# Patient Record
Sex: FEMALE | Race: BLACK OR AFRICAN AMERICAN | NOT HISPANIC OR LATINO | URBAN - METROPOLITAN AREA
[De-identification: names, ages, dates, MRNs, and addresses within clinical notes are randomized per-mention and may not be internally consistent; named-entity substitution may affect disease eponyms.]

---

## 2017-11-14 ENCOUNTER — EMERGENCY (EMERGENCY)
Facility: HOSPITAL | Age: 22
LOS: 1 days | Discharge: STILL ADMITTED OR ADMINISTRATIVE | End: 2017-11-14
Admitting: EMERGENCY MEDICINE

## 2017-11-14 ENCOUNTER — OUTPATIENT (OUTPATIENT)
Dept: OUTPATIENT SERVICES | Facility: HOSPITAL | Age: 22
LOS: 1 days | End: 2017-11-14

## 2017-11-14 VITALS
RESPIRATION RATE: 18 BRPM | HEART RATE: 85 BPM | SYSTOLIC BLOOD PRESSURE: 118 MMHG | OXYGEN SATURATION: 98 % | DIASTOLIC BLOOD PRESSURE: 64 MMHG | TEMPERATURE: 98 F

## 2017-11-14 DIAGNOSIS — Z3A.00 WEEKS OF GESTATION OF PREGNANCY NOT SPECIFIED: ICD-10-CM

## 2017-11-14 DIAGNOSIS — O26.899 OTHER SPECIFIED PREGNANCY RELATED CONDITIONS, UNSPECIFIED TRIMESTER: ICD-10-CM

## 2017-11-14 LAB
APPEARANCE UR: SIGNIFICANT CHANGE UP
BACTERIA # UR AUTO: SIGNIFICANT CHANGE UP
BILIRUB UR-MCNC: NEGATIVE — SIGNIFICANT CHANGE UP
BLOOD UR QL VISUAL: NEGATIVE — SIGNIFICANT CHANGE UP
COLOR SPEC: SIGNIFICANT CHANGE UP
GLUCOSE UR-MCNC: NEGATIVE — SIGNIFICANT CHANGE UP
KETONES UR-MCNC: NEGATIVE — SIGNIFICANT CHANGE UP
LEUKOCYTE ESTERASE UR-ACNC: NEGATIVE — SIGNIFICANT CHANGE UP
MUCOUS THREADS # UR AUTO: SIGNIFICANT CHANGE UP
NITRITE UR-MCNC: NEGATIVE — SIGNIFICANT CHANGE UP
PH UR: 7 — SIGNIFICANT CHANGE UP (ref 4.6–8)
PROT UR-MCNC: NEGATIVE — SIGNIFICANT CHANGE UP
RBC CASTS # UR COMP ASSIST: SIGNIFICANT CHANGE UP (ref 0–?)
SP GR SPEC: 1.01 — SIGNIFICANT CHANGE UP (ref 1–1.03)
SQUAMOUS # UR AUTO: SIGNIFICANT CHANGE UP
UROBILINOGEN FLD QL: NORMAL E.U. — SIGNIFICANT CHANGE UP (ref 0.1–0.2)
WBC UR QL: SIGNIFICANT CHANGE UP (ref 0–?)

## 2017-11-14 NOTE — ED ADULT NURSE NOTE - CHIEF COMPLAINT QUOTE
p/t is 20 weeks pregnant c/o of abd discomfort, denies any vag issues,  p/t seen in another ED for same issue and was  told "baby needs to be aborted"

## 2020-03-03 ENCOUNTER — TRANSCRIPTION ENCOUNTER (OUTPATIENT)
Age: 25
End: 2020-03-03

## 2020-07-02 ENCOUNTER — EMERGENCY (EMERGENCY)
Facility: HOSPITAL | Age: 25
LOS: 1 days | Discharge: HOME | End: 2020-07-02
Attending: EMERGENCY MEDICINE | Admitting: EMERGENCY MEDICINE
Payer: MEDICAID

## 2020-07-02 VITALS
TEMPERATURE: 99 F | OXYGEN SATURATION: 100 % | SYSTOLIC BLOOD PRESSURE: 104 MMHG | HEART RATE: 86 BPM | RESPIRATION RATE: 17 BRPM | DIASTOLIC BLOOD PRESSURE: 66 MMHG

## 2020-07-02 PROCEDURE — 99283 EMERGENCY DEPT VISIT LOW MDM: CPT

## 2020-07-02 NOTE — ED PROVIDER NOTE - NSFOLLOWUPINSTRUCTIONS_ED_ALL_ED_FT
Continue to take motrin or tylenol as needed for pain.  Avoid any heavy lifting, you may wear an abdominal band for support.  Follow up with a general surgeon- see attached referral list.  Return to ED for any worsening pain, vomiting or fever.

## 2020-07-02 NOTE — ED PROVIDER NOTE - CLINICAL SUMMARY MEDICAL DECISION MAKING FREE TEXT BOX
24 y/o F no PMHx s/p  2 years ago presents to ED c/o hernia pain. Pt noted to have reproducible hernia on exam with mild tenderness. No imagining indicated at this time as pt is very well appearing. Give surgical referral and continue NSAIDs. 26 y/o F no PMHx s/p  2 years ago presents to ED c/o hernia pain. Pt noted to have reproducible hernia on exam with mild tenderness. No imaging indicated at this time as pt is very well appearing.  Will give surgery referral and continue NSAIDs prn.

## 2020-07-02 NOTE — ED PROVIDER NOTE - OBJECTIVE STATEMENT
24 y/o F no PMHx s/p  2 years ago presents to ED c/o hernia pain. Pt states after she gave birth she developed abdominal hernia. Pt states hasn't given much problems however 4 days ago lifted her son and felt "tear" in area causing severe pain. Pt states have been taking Ibuprofen for pain which does help. Denies nausea, vomiting, fever. Notes has been spotting over past week after intercourse and has IDU in place. Former smoker. NKDA 24 y/o F no PMHx s/p  2 years ago presents to ED c/o hernia pain. Pt states after she gave birth she developed an abdominal hernia. Pt states was told doesn't need surgery however 4 days ago when she lifted her son she felt a "tear" in that area causing severe pain. Pt states she has been taking Ibuprofen for pain which does help. Denies nausea, vomiting, fever. Notes has been spotting over past week after intercourse and has IUD in place. Former smoker. NKDA 26 y/o F no PMHx s/p  2 years ago presents to ED c/o hernia pain. Pt states after she gave birth she developed an abdominal hernia. Pt states was told doesn't need surgery however 4 days ago when she lifted her son she felt a "tear" in that area causing severe pain. Pt states she has been taking Ibuprofen for pain which does help. Denies nausea, vomiting, fever. Notes has been spotting over past week after intercourse and has IUD in place. Former smoker. NKDA    Attendinyo female presents with increased pain and swelling to the umbilical area.  no nausea or vomiting.  able to reduce the area of swelling without difficulty.  no redness

## 2020-07-02 NOTE — ED PROVIDER NOTE - PATIENT PORTAL LINK FT
You can access the FollowMyHealth Patient Portal offered by [facility] by registering at the following website: [email or web address]. By joining [facility]’s FollowMyHealth portal, you will also be able to view your health information using other applications (apps) compatible with our system.

## 2020-07-20 ENCOUNTER — APPOINTMENT (OUTPATIENT)
Dept: SURGERY | Facility: CLINIC | Age: 25
End: 2020-07-20
Payer: MEDICAID

## 2020-07-20 VITALS
HEIGHT: 64.5 IN | DIASTOLIC BLOOD PRESSURE: 67 MMHG | BODY MASS INDEX: 24.18 KG/M2 | SYSTOLIC BLOOD PRESSURE: 106 MMHG | WEIGHT: 143.38 LBS | OXYGEN SATURATION: 100 % | HEART RATE: 76 BPM | TEMPERATURE: 98.2 F

## 2020-07-20 PROCEDURE — 99204 OFFICE O/P NEW MOD 45 MIN: CPT

## 2020-07-20 NOTE — PLAN
[FreeTextEntry1] : \par - I spoke with the patient regarding the findings and offered her robotic-assisted laparoscopic ventral hernia repair with mesh, possible open\par - We discussed the indications, risks, benefits, and alternatives of the procedure including the use of mesh\par - The patient stated she understood, gave consent, and all her questions were answered\par - Will plan for OR dates\par - Patient advised that should the pain worsen prior to procedure being performed, or if she develops symptoms consistent with obstruction, to go to the ED for immediate evaluation, to which she was agreeable to

## 2020-07-20 NOTE — HISTORY OF PRESENT ILLNESS
[de-identified] : Patient is a 25 year old woman who presents with supraumbilical hernia, which she states she first noted after the birth of her child, and has been worsening in the past few months. She notes that the hernia was initially reducible, but has no longer been so in the past few months, and is causing pain, particularly with movement. She denies N/V, no fever/chills, tolerating diet without issues, normal bowel function. She denies any medical problems, no past surgeries, occasionally smokes tobacco and drinks EtOH socially. NKDA.

## 2020-07-20 NOTE — PHYSICAL EXAM
[Respiratory Effort] : normal respiratory effort [Alert] : alert [Normal Rate and Rhythm] : normal rate and rhythm [Oriented to Person] : oriented to person [Oriented to Place] : oriented to place [Calm] : calm [Oriented to Time] : oriented to time [de-identified] : NCAT [de-identified] : NAD [de-identified] : Healed piercing superior to umbilicus without signs of infection [de-identified] : Soft, non-distended, non-reducible hernia superior to the umbilicus measuring approximately 2 cm in diameter which is TTP, non-TTP in all other quadrants, no rebound/guarding

## 2020-08-06 ENCOUNTER — OUTPATIENT (OUTPATIENT)
Dept: OUTPATIENT SERVICES | Facility: HOSPITAL | Age: 25
LOS: 1 days | Discharge: HOME | End: 2020-08-06

## 2020-08-06 VITALS
RESPIRATION RATE: 16 BRPM | WEIGHT: 147.27 LBS | HEART RATE: 71 BPM | TEMPERATURE: 99 F | OXYGEN SATURATION: 100 % | HEIGHT: 64 IN | DIASTOLIC BLOOD PRESSURE: 57 MMHG | SYSTOLIC BLOOD PRESSURE: 98 MMHG

## 2020-08-06 DIAGNOSIS — K43.6 OTHER AND UNSPECIFIED VENTRAL HERNIA WITH OBSTRUCTION, WITHOUT GANGRENE: ICD-10-CM

## 2020-08-06 DIAGNOSIS — Z01.818 ENCOUNTER FOR OTHER PREPROCEDURAL EXAMINATION: ICD-10-CM

## 2020-08-06 DIAGNOSIS — Z29.9 ENCOUNTER FOR PROPHYLACTIC MEASURES, UNSPECIFIED: ICD-10-CM

## 2020-08-06 LAB
ANION GAP SERPL CALC-SCNC: 5 MMOL/L — SIGNIFICANT CHANGE UP (ref 5–17)
APTT BLD: 36.4 SEC — HIGH (ref 27.5–35.5)
BLD GP AB SCN SERPL QL: SIGNIFICANT CHANGE UP
BUN SERPL-MCNC: 7 MG/DL — SIGNIFICANT CHANGE UP (ref 7–23)
CALCIUM SERPL-MCNC: 9.1 MG/DL — SIGNIFICANT CHANGE UP (ref 8.5–10.1)
CHLORIDE SERPL-SCNC: 105 MMOL/L — SIGNIFICANT CHANGE UP (ref 96–108)
CO2 SERPL-SCNC: 30 MMOL/L — SIGNIFICANT CHANGE UP (ref 22–31)
CREAT SERPL-MCNC: 0.6 MG/DL — SIGNIFICANT CHANGE UP (ref 0.5–1.3)
GLUCOSE SERPL-MCNC: 68 MG/DL — LOW (ref 70–99)
HCG UR QL: NEGATIVE — SIGNIFICANT CHANGE UP
HCT VFR BLD CALC: 37.7 % — SIGNIFICANT CHANGE UP (ref 34.5–45)
HGB BLD-MCNC: 11.7 G/DL — SIGNIFICANT CHANGE UP (ref 11.5–15.5)
INR BLD: 1.12 RATIO — SIGNIFICANT CHANGE UP (ref 0.88–1.16)
MCHC RBC-ENTMCNC: 26.8 PG — LOW (ref 27–34)
MCHC RBC-ENTMCNC: 31 GM/DL — LOW (ref 32–36)
MCV RBC AUTO: 86.3 FL — SIGNIFICANT CHANGE UP (ref 80–100)
NRBC # BLD: 0 /100 WBCS — SIGNIFICANT CHANGE UP (ref 0–0)
PLATELET # BLD AUTO: 284 K/UL — SIGNIFICANT CHANGE UP (ref 150–400)
POTASSIUM SERPL-MCNC: 4.1 MMOL/L — SIGNIFICANT CHANGE UP (ref 3.5–5.3)
POTASSIUM SERPL-SCNC: 4.1 MMOL/L — SIGNIFICANT CHANGE UP (ref 3.5–5.3)
PROTHROM AB SERPL-ACNC: 12.9 SEC — SIGNIFICANT CHANGE UP (ref 10.6–13.6)
RBC # BLD: 4.37 M/UL — SIGNIFICANT CHANGE UP (ref 3.8–5.2)
RBC # FLD: 14.4 % — SIGNIFICANT CHANGE UP (ref 10.3–14.5)
SODIUM SERPL-SCNC: 140 MMOL/L — SIGNIFICANT CHANGE UP (ref 135–145)
WBC # BLD: 4.68 K/UL — SIGNIFICANT CHANGE UP (ref 3.8–10.5)
WBC # FLD AUTO: 4.68 K/UL — SIGNIFICANT CHANGE UP (ref 3.8–10.5)

## 2020-08-06 NOTE — H&P PST ADULT - ATTENDING COMMENTS
I spoke with the patient regarding the planned robotic-assisted laparoscopic ventral hernia repair with mesh, possible open. We discussed the risks, benefits, and alternatives of the procedure. The patient stated she understood, gave consent, and all her questions were answered.

## 2020-08-06 NOTE — H&P PST ADULT - ASSESSMENT
SUMITI VTE 2.0 SCORE [CLOT updated 2019]    AGE RELATED RISK FACTORS                                                       MOBILITY RELATED FACTORS  [ ] Age 41-60 years                                            (1 Point)                    [ ] Bed rest                                                        (1 Point)  [ ] Age: 61-74 years                                           (2 Points)                  [ ] Plaster cast                                                   (2 Points)  [ ] Age= 75 years                                              (3 Points)                    [ ] Bed bound for more than 72 hours                 (2 Points)    DISEASE RELATED RISK FACTORS                                               GENDER SPECIFIC FACTORS  [ ] Edema in the lower extremities                       (1 Point)              [ ] Pregnancy                                                     (1 Point)  [ ] Varicose veins                                               (1 Point)                     [ ] Post-partum < 6 weeks                                   (1 Point)             [ x] BMI > 25 Kg/m2                                            (1 Point)                     [ ] Hormonal therapy  or oral contraception          (1 Point)                 [ ] Sepsis (in the previous month)                        (1 Point)               [ ] History of pregnancy complications                 (1 point)  [ ] Pneumonia or serious lung disease                                               [ ] Unexplained or recurrent                     (1 Point)           (in the previous month)                               (1 Point)  [ ] Abnormal pulmonary function test                     (1 Point)                 SURGERY RELATED RISK FACTORS  [ ] Acute myocardial infarction                              (1 Point)               [ ]  Section                                             (1 Point)  [ ] Congestive heart failure (in the previous month)  (1 Point)      [ ] Minor surgery                                                  (1 Point)   [ ] Inflammatory bowel disease                             (1 Point)               [ ] Arthroscopic surgery                                        (2 Points)  [ ] Central venous access                                      (2 Points)                [x ] General surgery lasting more than 45 minutes (2 points)  [ ] Malignancy- Present or previous                   (2 Points)                [ ] Elective arthroplasty                                         (5 points)    [ ] Stroke (in the previous month)                          (5 Points)                                                                                                                                                           HEMATOLOGY RELATED FACTORS                                                 TRAUMA RELATED RISK FACTORS  [ ] Prior episodes of VTE                                     (3 Points)                [ ] Fracture of the hip, pelvis, or leg                       (5 Points)  [ ] Positive family history for VTE                         (3 Points)             [ ] Acute spinal cord injury (in the previous month)  (5 Points)  [ ] Prothrombin 29211 A                                     (3 Points)               [ ] Paralysis  (less than 1 month)                             (5 Points)  [ ] Factor V Leiden                                             (3 Points)                  [ ] Multiple Trauma within 1 month                        (5 Points)  [ ] Lupus anticoagulants                                     (3 Points)                                                           [ ] Anticardiolipin antibodies                               (3 Points)                                                       [ ] High homocysteine in the blood                      (3 Points)                                             [ ] Other congenital or acquired thrombophilia      (3 Points)                                                [ ] Heparin induced thrombocytopenia                  (3 Points)                                     Total Score [      3    ]

## 2020-08-09 ENCOUNTER — OUTPATIENT (OUTPATIENT)
Dept: OUTPATIENT SERVICES | Facility: HOSPITAL | Age: 25
LOS: 1 days | Discharge: HOME | End: 2020-08-09

## 2020-08-09 DIAGNOSIS — Z01.818 ENCOUNTER FOR OTHER PREPROCEDURAL EXAMINATION: ICD-10-CM

## 2020-08-09 LAB — SARS-COV-2 RNA SPEC QL NAA+PROBE: SIGNIFICANT CHANGE UP

## 2020-08-11 ENCOUNTER — TRANSCRIPTION ENCOUNTER (OUTPATIENT)
Age: 25
End: 2020-08-11

## 2020-08-12 ENCOUNTER — TRANSCRIPTION ENCOUNTER (OUTPATIENT)
Age: 25
End: 2020-08-12

## 2020-08-12 ENCOUNTER — INPATIENT (INPATIENT)
Facility: HOSPITAL | Age: 25
LOS: 1 days | Discharge: HOME | End: 2020-08-14
Attending: STUDENT IN AN ORGANIZED HEALTH CARE EDUCATION/TRAINING PROGRAM | Admitting: STUDENT IN AN ORGANIZED HEALTH CARE EDUCATION/TRAINING PROGRAM
Payer: MEDICAID

## 2020-08-12 VITALS
WEIGHT: 145.06 LBS | OXYGEN SATURATION: 100 % | HEART RATE: 70 BPM | DIASTOLIC BLOOD PRESSURE: 72 MMHG | RESPIRATION RATE: 18 BRPM | SYSTOLIC BLOOD PRESSURE: 112 MMHG | HEIGHT: 64 IN | TEMPERATURE: 99 F

## 2020-08-12 LAB — HCG UR QL: NEGATIVE — SIGNIFICANT CHANGE UP

## 2020-08-12 PROCEDURE — 49653: CPT

## 2020-08-12 PROCEDURE — 49585: CPT | Mod: AS

## 2020-08-12 PROCEDURE — S2900 ROBOTIC SURGICAL SYSTEM: CPT | Mod: NC

## 2020-08-12 PROCEDURE — 49561: CPT | Mod: AS

## 2020-08-12 RX ORDER — SODIUM CHLORIDE 9 MG/ML
1000 INJECTION, SOLUTION INTRAVENOUS
Refills: 0 | Status: DISCONTINUED | OUTPATIENT
Start: 2020-08-12 | End: 2020-08-13

## 2020-08-12 RX ORDER — CYCLOBENZAPRINE HYDROCHLORIDE 10 MG/1
1 TABLET, FILM COATED ORAL
Qty: 20 | Refills: 0
Start: 2020-08-12

## 2020-08-12 RX ORDER — SODIUM CHLORIDE 9 MG/ML
3 INJECTION INTRAMUSCULAR; INTRAVENOUS; SUBCUTANEOUS EVERY 8 HOURS
Refills: 0 | Status: DISCONTINUED | OUTPATIENT
Start: 2020-08-12 | End: 2020-08-12

## 2020-08-12 RX ORDER — ONDANSETRON 8 MG/1
4 TABLET, FILM COATED ORAL ONCE
Refills: 0 | Status: DISCONTINUED | OUTPATIENT
Start: 2020-08-12 | End: 2020-08-12

## 2020-08-12 RX ORDER — MORPHINE SULFATE 50 MG/1
2 CAPSULE, EXTENDED RELEASE ORAL EVERY 4 HOURS
Refills: 0 | Status: DISCONTINUED | OUTPATIENT
Start: 2020-08-12 | End: 2020-08-13

## 2020-08-12 RX ORDER — HYDROMORPHONE HYDROCHLORIDE 2 MG/ML
0.5 INJECTION INTRAMUSCULAR; INTRAVENOUS; SUBCUTANEOUS
Refills: 0 | Status: DISCONTINUED | OUTPATIENT
Start: 2020-08-12 | End: 2020-08-12

## 2020-08-12 RX ORDER — HEPARIN SODIUM 5000 [USP'U]/ML
5000 INJECTION INTRAVENOUS; SUBCUTANEOUS EVERY 12 HOURS
Refills: 0 | Status: DISCONTINUED | OUTPATIENT
Start: 2020-08-12 | End: 2020-08-14

## 2020-08-12 RX ORDER — SODIUM CHLORIDE 9 MG/ML
1000 INJECTION, SOLUTION INTRAVENOUS
Refills: 0 | Status: DISCONTINUED | OUTPATIENT
Start: 2020-08-12 | End: 2020-08-12

## 2020-08-12 RX ORDER — HYDROMORPHONE HYDROCHLORIDE 2 MG/ML
1 INJECTION INTRAMUSCULAR; INTRAVENOUS; SUBCUTANEOUS ONCE
Refills: 0 | Status: DISCONTINUED | OUTPATIENT
Start: 2020-08-12 | End: 2020-08-12

## 2020-08-12 RX ORDER — FENTANYL CITRATE 50 UG/ML
50 INJECTION INTRAVENOUS
Refills: 0 | Status: DISCONTINUED | OUTPATIENT
Start: 2020-08-12 | End: 2020-08-12

## 2020-08-12 RX ORDER — KETOROLAC TROMETHAMINE 30 MG/ML
15 SYRINGE (ML) INJECTION EVERY 6 HOURS
Refills: 0 | Status: DISCONTINUED | OUTPATIENT
Start: 2020-08-12 | End: 2020-08-13

## 2020-08-12 RX ORDER — OXYCODONE AND ACETAMINOPHEN 5; 325 MG/1; MG/1
1 TABLET ORAL EVERY 4 HOURS
Refills: 0 | Status: DISCONTINUED | OUTPATIENT
Start: 2020-08-12 | End: 2020-08-12

## 2020-08-12 RX ORDER — OXYCODONE HYDROCHLORIDE 5 MG/1
10 TABLET ORAL EVERY 4 HOURS
Refills: 0 | Status: DISCONTINUED | OUTPATIENT
Start: 2020-08-12 | End: 2020-08-14

## 2020-08-12 RX ORDER — OXYCODONE HYDROCHLORIDE 5 MG/1
5 TABLET ORAL EVERY 4 HOURS
Refills: 0 | Status: DISCONTINUED | OUTPATIENT
Start: 2020-08-12 | End: 2020-08-14

## 2020-08-12 RX ORDER — FENTANYL CITRATE 50 UG/ML
25 INJECTION INTRAVENOUS
Refills: 0 | Status: DISCONTINUED | OUTPATIENT
Start: 2020-08-12 | End: 2020-08-12

## 2020-08-12 RX ORDER — ACETAMINOPHEN 500 MG
1000 TABLET ORAL ONCE
Refills: 0 | Status: COMPLETED | OUTPATIENT
Start: 2020-08-12 | End: 2020-08-13

## 2020-08-12 RX ORDER — CYCLOBENZAPRINE HYDROCHLORIDE 10 MG/1
5 TABLET, FILM COATED ORAL THREE TIMES A DAY
Refills: 0 | Status: DISCONTINUED | OUTPATIENT
Start: 2020-08-12 | End: 2020-08-14

## 2020-08-12 RX ADMIN — FENTANYL CITRATE 25 MICROGRAM(S): 50 INJECTION INTRAVENOUS at 13:52

## 2020-08-12 RX ADMIN — Medication 15 MILLIGRAM(S): at 18:30

## 2020-08-12 RX ADMIN — HYDROMORPHONE HYDROCHLORIDE 1 MILLIGRAM(S): 2 INJECTION INTRAMUSCULAR; INTRAVENOUS; SUBCUTANEOUS at 14:44

## 2020-08-12 RX ADMIN — MORPHINE SULFATE 2 MILLIGRAM(S): 50 CAPSULE, EXTENDED RELEASE ORAL at 21:01

## 2020-08-12 RX ADMIN — Medication 15 MILLIGRAM(S): at 23:18

## 2020-08-12 RX ADMIN — Medication 15 MILLIGRAM(S): at 23:48

## 2020-08-12 RX ADMIN — Medication 15 MILLIGRAM(S): at 17:39

## 2020-08-12 RX ADMIN — FENTANYL CITRATE 50 MICROGRAM(S): 50 INJECTION INTRAVENOUS at 14:10

## 2020-08-12 RX ADMIN — HYDROMORPHONE HYDROCHLORIDE 0.5 MILLIGRAM(S): 2 INJECTION INTRAMUSCULAR; INTRAVENOUS; SUBCUTANEOUS at 16:46

## 2020-08-12 RX ADMIN — MORPHINE SULFATE 2 MILLIGRAM(S): 50 CAPSULE, EXTENDED RELEASE ORAL at 21:31

## 2020-08-12 RX ADMIN — HEPARIN SODIUM 5000 UNIT(S): 5000 INJECTION INTRAVENOUS; SUBCUTANEOUS at 19:45

## 2020-08-12 RX ADMIN — SODIUM CHLORIDE 75 MILLILITER(S): 9 INJECTION, SOLUTION INTRAVENOUS at 13:47

## 2020-08-12 RX ADMIN — SODIUM CHLORIDE 3 MILLILITER(S): 9 INJECTION INTRAMUSCULAR; INTRAVENOUS; SUBCUTANEOUS at 08:52

## 2020-08-12 RX ADMIN — CYCLOBENZAPRINE HYDROCHLORIDE 5 MILLIGRAM(S): 10 TABLET, FILM COATED ORAL at 23:27

## 2020-08-12 RX ADMIN — FENTANYL CITRATE 50 MICROGRAM(S): 50 INJECTION INTRAVENOUS at 14:15

## 2020-08-12 RX ADMIN — HYDROMORPHONE HYDROCHLORIDE 0.5 MILLIGRAM(S): 2 INJECTION INTRAMUSCULAR; INTRAVENOUS; SUBCUTANEOUS at 17:43

## 2020-08-12 RX ADMIN — HYDROMORPHONE HYDROCHLORIDE 1 MILLIGRAM(S): 2 INJECTION INTRAMUSCULAR; INTRAVENOUS; SUBCUTANEOUS at 14:30

## 2020-08-12 NOTE — BRIEF OPERATIVE NOTE - NSICDXBRIEFPOSTOP_GEN_ALL_CORE_FT
POST-OP DIAGNOSIS:  Ventral hernia 12-Aug-2020 13:34:51  Tami Martinez POST-OP DIAGNOSIS:  Umbilical hernia 12-Aug-2020 14:01:18  Tami Martinez  Incarcerated ventral hernia 12-Aug-2020 14:00:28  Tami Martinez

## 2020-08-12 NOTE — BRIEF OPERATIVE NOTE - NSICDXBRIEFPROCEDURE_GEN_ALL_CORE_FT
PROCEDURES:  Robot-assisted repair of ventral hernia using da Jefry Xi 12-Aug-2020 13:34:35  Tami Martinez PROCEDURES:  Robot-assisted herniorrhaphy of umbilical hernia using da Jefry Xi 12-Aug-2020 14:02:39  Tami Martinez  Robot-assisted repair of ventral hernia using da Jefry Xi 12-Aug-2020 13:34:35  Tami Martinez

## 2020-08-12 NOTE — ASU DISCHARGE PLAN (ADULT/PEDIATRIC) - CALL YOUR DOCTOR IF YOU HAVE ANY OF THE FOLLOWING:
Nausea and vomiting that does not stop/Unable to urinate/Swelling that gets worse/Pain not relieved by Medications/Inability to tolerate liquids or foods/Numbness, tingling, color or temperature change to extremity/Increased irritability or sluggishness/Fever greater than (need to indicate Fahrenheit or Celsius)/Wound/Surgical Site with redness, or foul smelling discharge or pus/Bleeding that does not stop

## 2020-08-12 NOTE — PROGRESS NOTE ADULT - SUBJECTIVE AND OBJECTIVE BOX
25F s/p robotic assisted ventral hernia repair    Called by RN re: patient with uncontrolled pain post operatively.   Patient seen and examined in PACU, crying from pain.   Reports persistent abdominal pain, unrelieved by analgesics (dilaudid x 1, fentanyl x 3) in PACU.  Denies fever, chills, chest pain, sob, bloating, inability to urinate.     Vital Signs Last 24 Hrs  T(F): 98.6 (08-12-20 @ 15:55), Max: 98.8 (08-12-20 @ 08:40)  HR: 77 (08-12-20 @ 16:30)  BP: 102/56 (08-12-20 @ 16:30)  RR: 18 (08-12-20 @ 16:30)  SpO2: 100% (08-12-20 @ 16:30)    GENERAL: Alert, oriented, NAD  CHEST/LUNG: Clear to auscultation bilaterally, respirations nonlabored  HEART: S1S2, RRR  ABDOMEN: Pink foam dressings c/d/i over port sites. + Bowel sounds. Soft, mild/appropriate tenderness to palpation over port sites. No rigidity, no rebound tenderness   EXTREMITIES: No pedal edema b/l     A/P: 25F s/p robotic assisted ventral hernia repair. Post op pain. VSS.   - will admit patient overnight for observation and pain control  - RN to give toradol STAT, PO flexeril ordered ATC, ofirmev PRN  - post op care; DVT ppx, OOB to ambulate as tolerated, incentive spirometer  - diet as tolerated  - will d/w attending 25F s/p robotic assisted ventral hernia repair    Called by RN re: patient with uncontrolled pain post operatively.   Patient seen and examined in PACU resting comfortably.  When awoken, patient reported persistent abdominal pain, unrelieved by analgesics (dilaudid x 1, fentanyl x 3) in PACU.  Denied fever, chills, chest pain, sob, bloating, inability to urinate.     Vital Signs Last 24 Hrs  T(F): 98.6 (08-12-20 @ 15:55), Max: 98.8 (08-12-20 @ 08:40)  HR: 77 (08-12-20 @ 16:30)  BP: 102/56 (08-12-20 @ 16:30)  RR: 18 (08-12-20 @ 16:30)  SpO2: 100% (08-12-20 @ 16:30)    GENERAL: Alert, oriented, NAD  CHEST/LUNG: Clear to auscultation bilaterally, respirations nonlabored  HEART: S1S2, RRR  ABDOMEN: Pink foam dressings c/d/i over port sites. + Bowel sounds. Soft, mild/appropriate tenderness to palpation over port sites. No rigidity, no rebound tenderness   EXTREMITIES: No pedal edema b/l     A/P: 25F s/p robotic assisted ventral hernia repair. Post op pain. VSS.   - will admit patient overnight for observation and pain control  - RN to give toradol STAT, PO flexeril ordered ATC, ofirmev PRN  - post op care; DVT ppx, OOB to ambulate as tolerated, incentive spirometer  - diet as tolerated  - will d/w attending 25F s/p robotic assisted ventral hernia repair    Called by RN re: patient with uncontrolled pain post operatively.   Patient seen and examined in PACU resting comfortably.  When awoken, patient reported persistent abdominal pain, unrelieved by analgesics (dilaudid x 1, fentanyl x 3) in PACU.  Denied fever, chills, chest pain, sob, bloating, inability to urinate.     Vital Signs Last 24 Hrs  T(F): 98.6 (08-12-20 @ 15:55), Max: 98.8 (08-12-20 @ 08:40)  HR: 77 (08-12-20 @ 16:30)  BP: 102/56 (08-12-20 @ 16:30)  RR: 18 (08-12-20 @ 16:30)  SpO2: 100% (08-12-20 @ 16:30)    GENERAL: Alert, oriented, NAD  CHEST/LUNG: Clear to auscultation bilaterally, respirations nonlabored  HEART: S1S2, RRR  ABDOMEN: Pink foam dressings c/d/i over port sites. + Bowel sounds. Soft, mild/appropriate tenderness to palpation over port sites. No rigidity, no rebound tenderness   EXTREMITIES: No pedal edema b/l     A/P: 25F s/p robotic assisted ventral hernia repair. Post op pain. VSS.   - will admit patient overnight for observation and pain control  - RN to give toradol STAT, PO flexeril ordered ATC, ofirmev PRN  - ice packs PRN  - post op care; DVT ppx, OOB to ambulate as tolerated, incentive spirometer  - diet as tolerated  - will d/w attending

## 2020-08-12 NOTE — DISCHARGE NOTE PROVIDER - HOSPITAL COURSE
25F s/p robotic assisted ventral hernia repair 8/12/20 admitted for post-operative pain management. Pain improved.    At the time of discharge on POD#1, the patient was hemodynamically stable, was tolerating PO diet, was voiding urine, was ambulating, and was comfortable with adequate pain control. Patient instructed to follow up and to call the office to make an appointment. 25F s/p robotic assisted ventral hernia repair 8/12/20 admitted for post-operative pain management. Pain improved, tolerated advancement of diet.    At the time of discharge on POD#2, the patient was hemodynamically stable, was tolerating PO diet, was voiding urine, was ambulating, and was comfortable with adequate pain control. Patient instructed to follow up and to call the office to make an appointment.

## 2020-08-12 NOTE — ASU DISCHARGE PLAN (ADULT/PEDIATRIC) - CARE PROVIDER_API CALL
Abdiel Roa)  Surgery  [address] [place] [address]  Phone: [phone]  Fax: [phone]  Follow Up Time: 2 weeks

## 2020-08-12 NOTE — DISCHARGE NOTE PROVIDER - NSDCCPCAREPLAN_GEN_ALL_CORE_FT
PRINCIPAL DISCHARGE DIAGNOSIS  Diagnosis: Ventral hernia  Assessment and Plan of Treatment: Follow up in 7-10 days. Please call the office to make an appointment. Activity as tolerated. Rest as needed. You may eat a regular diet. Do not lift anything heavier than 10 pounds. You may take motrin or advil for mild pain as needed, in addition to prescribed narcotic medication. Do not drive while taking narcotic pain medication. You may take over the counter stool softeners as needed. Call for any fever over 101, nausea, vomiting, severe pain, no passing of gas or bowel movement. You may shower and pat dry abdomen. Leave the white steri strips in place; they will fall off in 5-7 days.

## 2020-08-12 NOTE — DISCHARGE NOTE PROVIDER - NSDCCPTREATMENT_GEN_ALL_CORE_FT
PRINCIPAL PROCEDURE  Procedure: Robot-assisted repair of ventral hernia using da Jefry Xi  Findings and Treatment:       SECONDARY PROCEDURE  Procedure: Robot-assisted herniorrhaphy of umbilical hernia using da Jefry Xi  Findings and Treatment:

## 2020-08-12 NOTE — BRIEF OPERATIVE NOTE - NSICDXBRIEFPREOP_GEN_ALL_CORE_FT
PRE-OP DIAGNOSIS:  Ventral hernia 12-Aug-2020 13:34:46  Tami Martinez PRE-OP DIAGNOSIS:  Umbilical hernia 12-Aug-2020 14:00:19  Tami Martinez  Incarcerated ventral hernia 12-Aug-2020 14:00:08  Tami Martinez

## 2020-08-12 NOTE — ASU DISCHARGE PLAN (ADULT/PEDIATRIC) - PROCEDURE
laparoscopic robot-assisted ventral hernia repair with mesh laparoscopic robot-assisted ventral and umbilical hernia repair with mesh

## 2020-08-12 NOTE — DISCHARGE NOTE PROVIDER - CARE PROVIDER_API CALL
Abdiel Rao)  Surgery  [address] [place] [address]  Phone: [phone]  Fax: [phone]  Follow Up Time:

## 2020-08-12 NOTE — DISCHARGE NOTE PROVIDER - NSDCMRMEDTOKEN_GEN_ALL_CORE_FT
cyclobenzaprine 5 mg oral tablet: 1 tab(s) orally 3 times a day, As needed, Muscle Spasm MDD:3  oxycodone-acetaminophen 5 mg-325 mg oral tablet: 1 tab(s) orally every 4-6 hours, As needed, Moderate to severe Pain  MDD:6

## 2020-08-12 NOTE — ASU DISCHARGE PLAN (ADULT/PEDIATRIC) - ASU DC SPECIAL INSTRUCTIONSFT
Follow up with Dr. Roa in 1-2 weeks. Please call to schedule an appointment.   NOTIFY YOUR SURGEON IF: You have any bleeding that does not stop, any pus draining from your wound, any fever (over 100.4 F) or chills, persistent nausea/vomiting, persistent diarrhea, or if your pain is not controlled on your discharge pain medications.    Wound: You may take off outer pink dressing and shower after 48 hours. After showering, pat steri strips dry. Leave the white steri strips in place, they will fall off on their own in approximately 5-7 days or they will be removed at your follow up appointment.

## 2020-08-12 NOTE — DISCHARGE NOTE PROVIDER - NSDCACTIVITY_GEN_ALL_CORE
Showering allowed/No heavy lifting/straining Do not make important decisions/No heavy lifting/straining/Walking - Outdoors allowed/Do not drive or operate machinery/Showering allowed/Stairs allowed/Walking - Indoors allowed

## 2020-08-12 NOTE — DISCHARGE NOTE PROVIDER - NSDCFUADDAPPT_GEN_ALL_CORE_FT
Please follow up with Dr. Roa in 10-14 days.  You may call his office to schedule an appointment. Please follow up with Dr. Roa in 10-14 days.  You may call his office to schedule an appointment.    Please follow up with your primary care physician regarding your recent hospitalization and for repeat MRI with Contrast for ?liver lesion seen in surgery.

## 2020-08-13 LAB
ANION GAP SERPL CALC-SCNC: 7 MMOL/L — SIGNIFICANT CHANGE UP (ref 5–17)
BUN SERPL-MCNC: 6 MG/DL — LOW (ref 7–23)
CALCIUM SERPL-MCNC: 8.4 MG/DL — LOW (ref 8.5–10.1)
CHLORIDE SERPL-SCNC: 105 MMOL/L — SIGNIFICANT CHANGE UP (ref 96–108)
CO2 SERPL-SCNC: 26 MMOL/L — SIGNIFICANT CHANGE UP (ref 22–31)
CREAT SERPL-MCNC: 0.64 MG/DL — SIGNIFICANT CHANGE UP (ref 0.5–1.3)
GLUCOSE SERPL-MCNC: 113 MG/DL — HIGH (ref 70–99)
HCT VFR BLD CALC: 35.4 % — SIGNIFICANT CHANGE UP (ref 34.5–45)
HGB BLD-MCNC: 10.6 G/DL — LOW (ref 11.5–15.5)
MAGNESIUM SERPL-MCNC: 2.1 MG/DL — SIGNIFICANT CHANGE UP (ref 1.6–2.6)
MCHC RBC-ENTMCNC: 26.1 PG — LOW (ref 27–34)
MCHC RBC-ENTMCNC: 29.9 GM/DL — LOW (ref 32–36)
MCV RBC AUTO: 87.2 FL — SIGNIFICANT CHANGE UP (ref 80–100)
NRBC # BLD: 0 /100 WBCS — SIGNIFICANT CHANGE UP (ref 0–0)
PHOSPHATE SERPL-MCNC: 2.8 MG/DL — SIGNIFICANT CHANGE UP (ref 2.5–4.5)
PLATELET # BLD AUTO: 234 K/UL — SIGNIFICANT CHANGE UP (ref 150–400)
POTASSIUM SERPL-MCNC: 3.5 MMOL/L — SIGNIFICANT CHANGE UP (ref 3.5–5.3)
POTASSIUM SERPL-SCNC: 3.5 MMOL/L — SIGNIFICANT CHANGE UP (ref 3.5–5.3)
RBC # BLD: 4.06 M/UL — SIGNIFICANT CHANGE UP (ref 3.8–5.2)
RBC # FLD: 14.5 % — SIGNIFICANT CHANGE UP (ref 10.3–14.5)
SODIUM SERPL-SCNC: 138 MMOL/L — SIGNIFICANT CHANGE UP (ref 135–145)
WBC # BLD: 10.69 K/UL — HIGH (ref 3.8–10.5)
WBC # FLD AUTO: 10.69 K/UL — HIGH (ref 3.8–10.5)

## 2020-08-13 PROCEDURE — 74181 MRI ABDOMEN W/O CONTRAST: CPT | Mod: 26

## 2020-08-13 RX ORDER — KETOROLAC TROMETHAMINE 30 MG/ML
15 SYRINGE (ML) INJECTION EVERY 6 HOURS
Refills: 0 | Status: DISCONTINUED | OUTPATIENT
Start: 2020-08-13 | End: 2020-08-14

## 2020-08-13 RX ORDER — ACETAMINOPHEN 500 MG
650 TABLET ORAL EVERY 6 HOURS
Refills: 0 | Status: DISCONTINUED | OUTPATIENT
Start: 2020-08-13 | End: 2020-08-14

## 2020-08-13 RX ORDER — ONDANSETRON 8 MG/1
4 TABLET, FILM COATED ORAL EVERY 6 HOURS
Refills: 0 | Status: DISCONTINUED | OUTPATIENT
Start: 2020-08-13 | End: 2020-08-14

## 2020-08-13 RX ADMIN — HEPARIN SODIUM 5000 UNIT(S): 5000 INJECTION INTRAVENOUS; SUBCUTANEOUS at 18:08

## 2020-08-13 RX ADMIN — MORPHINE SULFATE 2 MILLIGRAM(S): 50 CAPSULE, EXTENDED RELEASE ORAL at 03:41

## 2020-08-13 RX ADMIN — OXYCODONE HYDROCHLORIDE 10 MILLIGRAM(S): 5 TABLET ORAL at 20:47

## 2020-08-13 RX ADMIN — Medication 400 MILLIGRAM(S): at 06:51

## 2020-08-13 RX ADMIN — Medication 1000 MILLIGRAM(S): at 07:21

## 2020-08-13 RX ADMIN — CYCLOBENZAPRINE HYDROCHLORIDE 5 MILLIGRAM(S): 10 TABLET, FILM COATED ORAL at 21:47

## 2020-08-13 RX ADMIN — OXYCODONE HYDROCHLORIDE 5 MILLIGRAM(S): 5 TABLET ORAL at 21:46

## 2020-08-13 RX ADMIN — OXYCODONE HYDROCHLORIDE 10 MILLIGRAM(S): 5 TABLET ORAL at 14:20

## 2020-08-13 RX ADMIN — HEPARIN SODIUM 5000 UNIT(S): 5000 INJECTION INTRAVENOUS; SUBCUTANEOUS at 05:07

## 2020-08-13 RX ADMIN — OXYCODONE HYDROCHLORIDE 10 MILLIGRAM(S): 5 TABLET ORAL at 15:20

## 2020-08-13 RX ADMIN — OXYCODONE HYDROCHLORIDE 10 MILLIGRAM(S): 5 TABLET ORAL at 19:47

## 2020-08-13 RX ADMIN — ONDANSETRON 4 MILLIGRAM(S): 8 TABLET, FILM COATED ORAL at 22:47

## 2020-08-13 RX ADMIN — CYCLOBENZAPRINE HYDROCHLORIDE 5 MILLIGRAM(S): 10 TABLET, FILM COATED ORAL at 14:25

## 2020-08-13 RX ADMIN — Medication 650 MILLIGRAM(S): at 18:52

## 2020-08-13 RX ADMIN — Medication 15 MILLIGRAM(S): at 16:30

## 2020-08-13 RX ADMIN — Medication 15 MILLIGRAM(S): at 05:38

## 2020-08-13 RX ADMIN — Medication 15 MILLIGRAM(S): at 05:08

## 2020-08-13 RX ADMIN — MORPHINE SULFATE 2 MILLIGRAM(S): 50 CAPSULE, EXTENDED RELEASE ORAL at 03:11

## 2020-08-13 RX ADMIN — Medication 15 MILLIGRAM(S): at 16:17

## 2020-08-13 RX ADMIN — OXYCODONE HYDROCHLORIDE 5 MILLIGRAM(S): 5 TABLET ORAL at 22:46

## 2020-08-13 RX ADMIN — OXYCODONE HYDROCHLORIDE 5 MILLIGRAM(S): 5 TABLET ORAL at 11:52

## 2020-08-13 RX ADMIN — OXYCODONE HYDROCHLORIDE 5 MILLIGRAM(S): 5 TABLET ORAL at 12:52

## 2020-08-13 RX ADMIN — Medication 650 MILLIGRAM(S): at 18:08

## 2020-08-13 NOTE — PROGRESS NOTE ADULT - SUBJECTIVE AND OBJECTIVE BOX
25y old  Female who presented with a chief complaint of Post-op pain management and was admitted with ROBOTIC ASSISTED LAPAROSCOPIC VENTRAL HERNIA REPAIR WITH MES    Patient seen and examined at bedside with no complaints. Patient reports inability to turn over on side by herself while in bed due to weakness. Reports 10/10 pain diffuse abdominal and back pain when attempting to move or when repositioned in bed. Patient nausea or vomiting. Reports headache.     Vital Signs Last 24 Hrs  T(F): 99.3 (08-13-20 @ 05:30), Max: 100.4 (08-12-20 @ 21:33)  HR: 82 (08-13-20 @ 05:30)  BP: 124/76 (08-13-20 @ 05:30)  RR: 16 (08-13-20 @ 05:30)  SpO2: 100% (08-13-20 @ 05:30)  Wt(kg): --   CAPILLARY BLOOD GLUCOSE          GENERAL: Alert, NAD  CHEST/LUNG: Clear to auscultation bilaterally, respirations nonlabored  HEART: S1S2, Regular rate and rhythm;   ABDOMEN: + Bowel sounds, soft, Nondistended. Very tender. Patient reported significant pain on auscultation. Unable to percuss or palpate due to patient pain.   EXTREMITIES:  no calf tenderness, No edema. Weakness in upper extremities. Lower extremities intact. WWP in UE and LE b/l.     I&O's Detail    12 Aug 2020 07:01  -  13 Aug 2020 07:00  --------------------------------------------------------  IN:    lactated ringers.: 1320 mL    Oral Fluid: 460 mL  Total IN: 1780 mL    OUT:    Emesis: 10 mL    Voided: 600 mL  Total OUT: 610 mL    Total NET: 1170 mL          LABS:                        10.6   10.69 )-----------( 234      ( 13 Aug 2020 06:32 )             35.4     08-13    138  |  105  |  6<L>  ----------------------------<  113<H>  3.5   |  26  |  0.64    Ca    8.4<L>      13 Aug 2020 06:32          RADIOLOGY & ADDITIONAL STUDIES:     25y old  Female s/p secondary to ROBOTIC ASSISTED LAPAROSCOPIC VENTRAL HERNIA REPAIR WITH MES  , POD# with PMH No pertinent past medical history    - improved pain control: consider changing morphine to dilaudid.  - continue local wound care  - antibiotics per ID  - f/u labs  - DVT prophylaxis, Incentive Spirometer, OOB, Ambulating, pain control 25y old  Female who presented with a chief complaint of Post-op pain management and was admitted with ROBOTIC ASSISTED LAPAROSCOPIC VENTRAL HERNIA REPAIR WITH MES    Patient seen and examined at bedside with no complaints. Patient reports inability to turn over on side by herself while in bed due to weakness. Reports 10/10 diffuse abdominal and back pain when attempting to move or when repositioned in bed. Patient denies nausea or vomiting. Reports intermittent headache - pain localizes to midline of forehead, no visual changes. No chest pain, no palpitations, no SOB, no dyspnea.     Vital Signs Last 24 Hrs  T(F): 99.3 (08-13-20 @ 05:30), Max: 100.4 (08-12-20 @ 21:33)  HR: 82 (08-13-20 @ 05:30)  BP: 124/76 (08-13-20 @ 05:30)  RR: 16 (08-13-20 @ 05:30)  SpO2: 100% (08-13-20 @ 05:30)  Wt(kg): --   CAPILLARY BLOOD GLUCOSE          GENERAL: Alert, NAD  CHEST/LUNG: Clear to auscultation bilaterally, respirations nonlabored  HEART: S1S2, Regular rate and rhythm;   ABDOMEN: + Bowel sounds, soft, Nondistended. Very tender. Patient reported significant pain on auscultation. Unable to percuss or palpate due to patient pain.   EXTREMITIES:  no calf tenderness, No edema. Weakness in upper extremities. Lower extremities intact. WWP in UE and LE b/l.     I&O's Detail    12 Aug 2020 07:01  -  13 Aug 2020 07:00  --------------------------------------------------------  IN:    lactated ringers.: 1320 mL    Oral Fluid: 460 mL  Total IN: 1780 mL    OUT:    Emesis: 10 mL    Voided: 600 mL  Total OUT: 610 mL    Total NET: 1170 mL          LABS:                        10.6   10.69 )-----------( 234      ( 13 Aug 2020 06:32 )             35.4     08-13    138  |  105  |  6<L>  ----------------------------<  113<H>  3.5   |  26  |  0.64    Ca    8.4<L>      13 Aug 2020 06:32          RADIOLOGY & ADDITIONAL STUDIES:     25y old  Female s/p secondary to ROBOTIC ASSISTED LAPAROSCOPIC VENTRAL HERNIA REPAIR WITH MES  , POD# with PMH No pertinent past medical history    - improved pain control: consider changing morphine to dilaudid.  - continue local wound care  - antibiotics per ID  - f/u labs  - DVT prophylaxis, Incentive Spirometer, OOB, Ambulating, pain control 25y old  Female who presented with a chief complaint of Post-op pain management and was admitted with ROBOTIC ASSISTED LAPAROSCOPIC VENTRAL HERNIA REPAIR WITH MES    Patient seen and examined at bedside with no complaints. Patient reports inability to turn over on side by herself while in bed due to weakness. Patient reports minimal pain at rest, but reports 10/10 diffuse abdominal and back pain when attempting to move or when repositioned in bed. Patient denies nausea or vomiting. Reports intermittent headache - pain localizes to midline of forehead, no visual changes. No chest pain, no palpitations, no SOB, no dyspnea.     Vital Signs Last 24 Hrs  T(F): 99.3 (08-13-20 @ 05:30), Max: 100.4 (08-12-20 @ 21:33)  HR: 82 (08-13-20 @ 05:30)  BP: 124/76 (08-13-20 @ 05:30)  RR: 16 (08-13-20 @ 05:30)  SpO2: 100% (08-13-20 @ 05:30)  Wt(kg): --   CAPILLARY BLOOD GLUCOSE          GENERAL: Alert, NAD  CHEST/LUNG: Clear to auscultation bilaterally, respirations nonlabored  HEART: S1S2, Regular rate and rhythm;   ABDOMEN: + Bowel sounds, soft, Nondistended. Very tender. Patient reported significant pain on auscultation of abdomen. Exam limited due to patient discomfort - unable to percuss or palpate due to patient pain.   EXTREMITIES:  no calf tenderness, No edema. Weakness in upper extremities. Lower extremities intact. WWP in UE and LE b/l.     I&O's Detail    12 Aug 2020 07:01  -  13 Aug 2020 07:00  --------------------------------------------------------  IN:    lactated ringers.: 1320 mL    Oral Fluid: 460 mL  Total IN: 1780 mL    OUT:    Emesis: 10 mL    Voided: 600 mL  Total OUT: 610 mL    Total NET: 1170 mL          LABS:                        10.6   10.69 )-----------( 234      ( 13 Aug 2020 06:32 )             35.4     08-13    138  |  105  |  6<L>  ----------------------------<  113<H>  3.5   |  26  |  0.64    Ca    8.4<L>      13 Aug 2020 06:32          RADIOLOGY & ADDITIONAL STUDIES:     25y old  Female s/p secondary to ROBOTIC ASSISTED LAPAROSCOPIC VENTRAL HERNIA REPAIR WITH MES  , POD# with PMH No pertinent past medical history    - improved pain control: consider changing morphine to dilaudid.  - continue local wound care  - antibiotics per ID  - f/u labs  - DVT prophylaxis, Incentive Spirometer, OOB, Ambulating, pain control

## 2020-08-13 NOTE — PROGRESS NOTE ADULT - SUBJECTIVE AND OBJECTIVE BOX
SURGERY PROGRESS HPI:  POD#1  Pt seen and examined at bedside. Post-op pain is somewhat controlled on pain medication. Pt denies complaints. No acute events overnight. Pt has not had her diet yet. Vomited small amount of clear fluid overnight. No nausea now. No BM/flatus. Voiding well. Pt denies chest pain, SOB, dizziness, fever, chills.     Vital Signs Last 24 Hrs  T(C): 37.4 (13 Aug 2020 05:30), Max: 38 (12 Aug 2020 21:33)  T(F): 99.3 (13 Aug 2020 05:30), Max: 100.4 (12 Aug 2020 21:33)  HR: 82 (13 Aug 2020 05:30) (70 - 99)  BP: 124/76 (13 Aug 2020 05:30) (102/56 - 149/90)  BP(mean): --  RR: 16 (13 Aug 2020 05:30) (16 - 25)  SpO2: 100% (13 Aug 2020 05:30) (97% - 100%)      PHYSICAL EXAM:    GENERAL: NAD  CHEST/LUNG: Clear to ausculation, bilaterally   HEART: RRR S1S2  ABDOMEN: Dressings clean/dry/intact. non distended, +BS, soft, appropriate incisional tenderness.  EXTREMITIES:  calf soft, non tender b/l    I&O's Detail    12 Aug 2020 07:01  -  13 Aug 2020 06:22  --------------------------------------------------------  IN:    lactated ringers.: 1320 mL    Oral Fluid: 460 mL  Total IN: 1780 mL    OUT:    Voided: 600 mL  Total OUT: 600 mL    Total NET: 1180 mL      LABS: pending        A/P: 25F s/p robotic assisted ventral hernia repair. Post op pain. VSS.   - Pain control: toradol, PO flexeril ATC, ofirmev prn, morphine prn, oxycodone prn  - ice packs PRN  - post op care; DVT ppx, OOB to ambulate as tolerated, incentive spirometer  - diet as tolerated  - will d/w attending SURGERY PROGRESS HPI:  POD#1  Pt seen and examined at bedside. Post-op pain is somewhat controlled on pain medication. Pt denies complaints. No acute events overnight. Pt has not had her diet yet. Vomited small amount of clear fluid overnight. No nausea now. No BM/flatus. Voiding well. Pt denies chest pain, SOB, dizziness, fever, chills.     Vital Signs Last 24 Hrs  T(C): 37.4 (13 Aug 2020 05:30), Max: 38 (12 Aug 2020 21:33)  T(F): 99.3 (13 Aug 2020 05:30), Max: 100.4 (12 Aug 2020 21:33)  HR: 82 (13 Aug 2020 05:30) (70 - 99)  BP: 124/76 (13 Aug 2020 05:30) (102/56 - 149/90)  BP(mean): --  RR: 16 (13 Aug 2020 05:30) (16 - 25)  SpO2: 100% (13 Aug 2020 05:30) (97% - 100%)      PHYSICAL EXAM:    GENERAL: NAD  CHEST/LUNG: Clear to ausculation, bilaterally   HEART: RRR S1S2  ABDOMEN: Dressings clean/dry/intact. non distended, +BS, soft, appropriate incisional tenderness.  EXTREMITIES:  calf soft, non tender b/l    I&O's Detail    12 Aug 2020 07:01  -  13 Aug 2020 06:22  --------------------------------------------------------  IN:    lactated ringers.: 1320 mL    Oral Fluid: 460 mL  Total IN: 1780 mL    OUT:    Voided: 600 mL  Total OUT: 600 mL    Total NET: 1180 mL      LABS: pending        A/P: 25F s/p robotic assisted ventral hernia repair POD#1. Post op pain. VSS.   - Pain control: toradol, PO flexeril ATC, ofirmev prn, morphine prn, oxycodone prn  - ice packs PRN  - post op care; DVT ppx, OOB to ambulate as tolerated, incentive spirometer  - diet as tolerated  - will d/w attending

## 2020-08-13 NOTE — CHART NOTE - NSCHARTNOTEFT_GEN_A_CORE
Patient seen at bedside for f/u.  Resting, no acute distress.  States her pain was well controlled overnight. Currently, reports mild upper abdominal discomfort.  Temp 100.4 at 21:33, otherwise afebrile.  Voiding without issue.   Denies chills, chest pain, sob.     Exam:  Gen: Alert, oriented, NAD  CV: S1S2 RRR  Lungs: CTA b/l, respirations nonlabored  Abd: Pink foam dressings c/d/i over port sites. +BS, soft, nondistended, mild/appropriate incisional tenderness, no guarding, no rigidity  Ext: No pedal edema b/l    - discussed with Dr. Roa; discharge planning today  - post op care discussed with patient- incentive spirometer, ambulating frequently, pain management, reasons to return to ER such as fever/chills, chest pain, sob, intractable pain, inability to have a bowel movement, etc

## 2020-08-14 ENCOUNTER — TRANSCRIPTION ENCOUNTER (OUTPATIENT)
Age: 25
End: 2020-08-14

## 2020-08-14 VITALS
TEMPERATURE: 99 F | HEART RATE: 76 BPM | SYSTOLIC BLOOD PRESSURE: 97 MMHG | OXYGEN SATURATION: 99 % | DIASTOLIC BLOOD PRESSURE: 63 MMHG | RESPIRATION RATE: 16 BRPM

## 2020-08-14 LAB
ANION GAP SERPL CALC-SCNC: 7 MMOL/L — SIGNIFICANT CHANGE UP (ref 5–17)
BUN SERPL-MCNC: 9 MG/DL — SIGNIFICANT CHANGE UP (ref 7–23)
CALCIUM SERPL-MCNC: 8.3 MG/DL — LOW (ref 8.5–10.1)
CHLORIDE SERPL-SCNC: 106 MMOL/L — SIGNIFICANT CHANGE UP (ref 96–108)
CO2 SERPL-SCNC: 27 MMOL/L — SIGNIFICANT CHANGE UP (ref 22–31)
CREAT SERPL-MCNC: 0.74 MG/DL — SIGNIFICANT CHANGE UP (ref 0.5–1.3)
GLUCOSE SERPL-MCNC: 128 MG/DL — HIGH (ref 70–99)
HCT VFR BLD CALC: 35.4 % — SIGNIFICANT CHANGE UP (ref 34.5–45)
HGB BLD-MCNC: 10.8 G/DL — LOW (ref 11.5–15.5)
MCHC RBC-ENTMCNC: 26.3 PG — LOW (ref 27–34)
MCHC RBC-ENTMCNC: 30.5 GM/DL — LOW (ref 32–36)
MCV RBC AUTO: 86.3 FL — SIGNIFICANT CHANGE UP (ref 80–100)
NRBC # BLD: 0 /100 WBCS — SIGNIFICANT CHANGE UP (ref 0–0)
PLATELET # BLD AUTO: 230 K/UL — SIGNIFICANT CHANGE UP (ref 150–400)
POTASSIUM SERPL-MCNC: 3.4 MMOL/L — LOW (ref 3.5–5.3)
POTASSIUM SERPL-SCNC: 3.4 MMOL/L — LOW (ref 3.5–5.3)
RBC # BLD: 4.1 M/UL — SIGNIFICANT CHANGE UP (ref 3.8–5.2)
RBC # FLD: 14.6 % — HIGH (ref 10.3–14.5)
SODIUM SERPL-SCNC: 140 MMOL/L — SIGNIFICANT CHANGE UP (ref 135–145)
WBC # BLD: 5.78 K/UL — SIGNIFICANT CHANGE UP (ref 3.8–10.5)
WBC # FLD AUTO: 5.78 K/UL — SIGNIFICANT CHANGE UP (ref 3.8–10.5)

## 2020-08-14 RX ORDER — POTASSIUM CHLORIDE 20 MEQ
20 PACKET (EA) ORAL ONCE
Refills: 0 | Status: COMPLETED | OUTPATIENT
Start: 2020-08-14 | End: 2020-08-14

## 2020-08-14 RX ADMIN — Medication 650 MILLIGRAM(S): at 11:29

## 2020-08-14 RX ADMIN — Medication 650 MILLIGRAM(S): at 01:53

## 2020-08-14 RX ADMIN — Medication 15 MILLIGRAM(S): at 13:00

## 2020-08-14 RX ADMIN — OXYCODONE HYDROCHLORIDE 10 MILLIGRAM(S): 5 TABLET ORAL at 11:37

## 2020-08-14 RX ADMIN — OXYCODONE HYDROCHLORIDE 5 MILLIGRAM(S): 5 TABLET ORAL at 14:33

## 2020-08-14 RX ADMIN — OXYCODONE HYDROCHLORIDE 5 MILLIGRAM(S): 5 TABLET ORAL at 15:30

## 2020-08-14 RX ADMIN — Medication 15 MILLIGRAM(S): at 05:06

## 2020-08-14 RX ADMIN — HEPARIN SODIUM 5000 UNIT(S): 5000 INJECTION INTRAVENOUS; SUBCUTANEOUS at 05:06

## 2020-08-14 RX ADMIN — Medication 15 MILLIGRAM(S): at 00:53

## 2020-08-14 RX ADMIN — Medication 15 MILLIGRAM(S): at 05:36

## 2020-08-14 RX ADMIN — Medication 20 MILLIEQUIVALENT(S): at 09:03

## 2020-08-14 RX ADMIN — Medication 15 MILLIGRAM(S): at 01:33

## 2020-08-14 RX ADMIN — Medication 650 MILLIGRAM(S): at 12:29

## 2020-08-14 RX ADMIN — OXYCODONE HYDROCHLORIDE 10 MILLIGRAM(S): 5 TABLET ORAL at 10:37

## 2020-08-14 RX ADMIN — Medication 650 MILLIGRAM(S): at 00:53

## 2020-08-14 RX ADMIN — Medication 15 MILLIGRAM(S): at 11:30

## 2020-08-14 NOTE — DISCHARGE NOTE NURSING/CASE MANAGEMENT/SOCIAL WORK - NSDCPEWEB_GEN_ALL_CORE
[place] website --- [email or web address] Tobacco [email or web address] website --- [email or web address]

## 2020-08-14 NOTE — CHART NOTE - NSCHARTNOTEFT_GEN_A_CORE
Surgery Follow up/AM labs reviewed:    Pt seen for follow up, doing well, complains of postop pain, but improving since yesterday and wants to go home today. Denies N/V, tolerating diet. Denies flatus or BM. Has not ambulated yet. Voiding well.    VSS  Abdomen: Steri strips CDI. Mild distention, +BS, soft, appropriate incisional tenderness, no guarding/rebound     AM labs:                        10.8   5.78  )-----------( 230      ( 14 Aug 2020 07:06 )             35.4   08-14    140  |  106  |  9   ----------------------------<  128<H>  3.4<L>   |  27  |  0.74    Ca    8.3<L>      14 Aug 2020 07:06  Phos  2.8     08-13  Mg     2.1     08-13      A/P: 25F s/p robotic assisted ventral hernia repair POD#2. Post op pain- improving, well controlled.     - hypokalemia- repleted   - D/C home today, f/u in the office in 10-14 days  - Pain control  - ice packs PRN  - post op care; DVT ppx, OOB to ambulate as tolerated, incentive spirometer  - regular diet as tolerated  - Discussed with Dr. Roa

## 2020-08-14 NOTE — DISCHARGE NOTE NURSING/CASE MANAGEMENT/SOCIAL WORK - NSDCFUADDAPPT_GEN_ALL_CORE_FT
Please follow up with Dr. Roa in 10-14 days.  You may call his office to schedule an appointment.    Please follow up with your primary care physician regarding your recent hospitalization and for repeat MRI with Contrast for ?liver lesion seen in surgery.

## 2020-08-14 NOTE — PROGRESS NOTE ADULT - SUBJECTIVE AND OBJECTIVE BOX
SURGERY PROGRESS HPI:  POD#2  Pt seen and examined at bedside. Pain is well controlled on pain medication and patient is feeling much better this morning. Pt denies complaints. Pt tolerating clear liquid diet. Has not had regular food yet. Vomited NBNB emesis overnight. Pt denies nausea now. No BM/flatus yet. Voiding well. Pt denies chest pain, SOB, dizziness, fever, chills. States that she has not ambulated yet.     Vital Signs Last 24 Hrs  T(C): 37.2 (14 Aug 2020 05:51), Max: 37.3 (13 Aug 2020 13:00)  T(F): 99 (14 Aug 2020 05:51), Max: 99.1 (13 Aug 2020 13:00)  HR: 86 (14 Aug 2020 05:51) (65 - 86)  BP: 106/63 (14 Aug 2020 05:51) (103/65 - 123/84)  BP(mean): --  RR: 17 (14 Aug 2020 05:51) (16 - 17)  SpO2: 100% (14 Aug 2020 05:51) (100% - 100%)      PHYSICAL EXAM:    GENERAL: NAD  CHEST/LUNG: Clear to ausculation, bilaterally   HEART: RRR S1S2  ABDOMEN: Steri strips clean/dry/intact. softly distended, +BS, soft, appropriate incisional tenderness  EXTREMITIES:  calf soft, non tender b/l    I&O's Detail    12 Aug 2020 07:01  -  13 Aug 2020 07:00  --------------------------------------------------------  IN:    lactated ringers.: 1320 mL    Oral Fluid: 460 mL  Total IN: 1780 mL    OUT:    Emesis: 10 mL    Voided: 600 mL  Total OUT: 610 mL    Total NET: 1170 mL      13 Aug 2020 07:01  -  14 Aug 2020 06:27  --------------------------------------------------------  IN:    Oral Fluid: 480 mL  Total IN: 480 mL    OUT:    Emesis: 40 mL  Total OUT: 40 mL    Total NET: 440 mL      LABS:                        10.6   10.69 )-----------( 234      ( 13 Aug 2020 06:32 )             35.4     08-13    138  |  105  |  6<L>  ----------------------------<  113<H>  3.5   |  26  |  0.64    Ca    8.4<L>      13 Aug 2020 06:32  Phos  2.8     08-13  Mg     2.1     08-13    < from: MR Abdomen No Cont (08.13.20 @ 15:59) >  IMPRESSION:  Please note that the patient refused IV contrast. Only noncontrast study was performed.  Question of left hepatic mass (4 x 3 cm), isointense to the rest of the liver and poorly characterized without intravenous contrast.  No cholelithiasis, choledocholithiasis, or biliary ductal dilatation.    < end of copied text >      A/P: 25F s/p robotic assisted ventral hernia repair POD#2. Post op pain. VSS.   - Pain control  - ice packs PRN  - post op care; DVT ppx, OOB to ambulate as tolerated, incentive spirometer  - regular diet as tolerated  - will d/w attending SURGERY PROGRESS HPI:  POD#2  Pt seen and examined at bedside. Pain is well controlled on pain medication and patient is feeling much better this morning. Pt denies complaints. Pt tolerating clear liquid diet. Has not had regular food yet. Vomited NBNB emesis overnight - 3 episodes in evening. Pt denies nausea now. No BM/flatus yet. Voiding well; no dysuria or cloudiness. Pt denies chest pain, SOB, dizziness, fever, chills. States that she has not ambulated yet.     Vital Signs Last 24 Hrs  T(C): 37.2 (14 Aug 2020 05:51), Max: 37.3 (13 Aug 2020 13:00)  T(F): 99 (14 Aug 2020 05:51), Max: 99.1 (13 Aug 2020 13:00)  HR: 86 (14 Aug 2020 05:51) (65 - 86)  BP: 106/63 (14 Aug 2020 05:51) (103/65 - 123/84)  BP(mean): --  RR: 17 (14 Aug 2020 05:51) (16 - 17)  SpO2: 100% (14 Aug 2020 05:51) (100% - 100%)      PHYSICAL EXAM:    GENERAL: NAD  CHEST/LUNG: Clear to ausculation, bilaterally   HEART: RRR S1S2  ABDOMEN: Steri strips clean/dry/intact. softly distended, +BS, soft, appropriate incisional tenderness  EXTREMITIES:  calf soft, non tender b/l    I&O's Detail    12 Aug 2020 07:01  -  13 Aug 2020 07:00  --------------------------------------------------------  IN:    lactated ringers.: 1320 mL    Oral Fluid: 460 mL  Total IN: 1780 mL    OUT:    Emesis: 10 mL    Voided: 600 mL  Total OUT: 610 mL    Total NET: 1170 mL      13 Aug 2020 07:01  -  14 Aug 2020 06:27  --------------------------------------------------------  IN:    Oral Fluid: 480 mL  Total IN: 480 mL    OUT:    Emesis: 40 mL  Total OUT: 40 mL    Total NET: 440 mL      LABS:                        10.6   10.69 )-----------( 234      ( 13 Aug 2020 06:32 )             35.4     08-13    138  |  105  |  6<L>  ----------------------------<  113<H>  3.5   |  26  |  0.64    Ca    8.4<L>      13 Aug 2020 06:32  Phos  2.8     08-13  Mg     2.1     08-13    < from: MR Abdomen No Cont (08.13.20 @ 15:59) >  IMPRESSION:  Please note that the patient refused IV contrast. Only noncontrast study was performed.  Question of left hepatic mass (4 x 3 cm), isointense to the rest of the liver and poorly characterized without intravenous contrast.  No cholelithiasis, choledocholithiasis, or biliary ductal dilatation.    < end of copied text >      A/P: 25F s/p robotic assisted ventral hernia repair POD#2. Post op pain. VSS.   - Pain control  - ice packs PRN  - post op care; DVT ppx, OOB to ambulate as tolerated, incentive spirometer  - regular diet as tolerated  - will d/w attending

## 2020-08-14 NOTE — DISCHARGE NOTE NURSING/CASE MANAGEMENT/SOCIAL WORK - NSDCPEEMAIL_GEN_ALL_CORE
[facility] for Tobacco Control email [email or web address]

## 2020-08-14 NOTE — DISCHARGE NOTE NURSING/CASE MANAGEMENT/SOCIAL WORK - PATIENT PORTAL LINK FT
You can access the FollowMyHealth Patient Portal offered by [facility] by registering at the following website: [email or web address]. By joining [employer]’s FollowMyHealth portal, you will also be able to view your health information using other applications (apps) compatible with our system.

## 2020-08-19 DIAGNOSIS — K43.6 OTHER AND UNSPECIFIED VENTRAL HERNIA WITH OBSTRUCTION, WITHOUT GANGRENE: ICD-10-CM

## 2020-08-19 DIAGNOSIS — K42.9 UMBILICAL HERNIA WITHOUT OBSTRUCTION OR GANGRENE: ICD-10-CM

## 2020-08-19 DIAGNOSIS — E87.6 HYPOKALEMIA: ICD-10-CM

## 2020-08-19 DIAGNOSIS — K76.9 LIVER DISEASE, UNSPECIFIED: ICD-10-CM

## 2020-08-20 NOTE — DISCHARGE NOTE PROVIDER - PROVIDER TOKENS
Please call heart function is about the same as prior echo 35-40% EF. Pressures are normal and no clot seen in the heart. Keep follow up.
PROVIDER:[TOKEN:[[id number]]]

## 2020-08-25 ENCOUNTER — APPOINTMENT (OUTPATIENT)
Dept: SURGERY | Facility: CLINIC | Age: 25
End: 2020-08-25
Payer: MEDICAID

## 2020-08-25 VITALS
OXYGEN SATURATION: 100 % | BODY MASS INDEX: 24.45 KG/M2 | TEMPERATURE: 98.7 F | RESPIRATION RATE: 16 BRPM | SYSTOLIC BLOOD PRESSURE: 103 MMHG | WEIGHT: 145 LBS | HEIGHT: 64.5 IN | DIASTOLIC BLOOD PRESSURE: 69 MMHG | HEART RATE: 91 BPM

## 2020-08-25 PROCEDURE — 99024 POSTOP FOLLOW-UP VISIT: CPT

## 2020-08-25 NOTE — HISTORY OF PRESENT ILLNESS
[de-identified] : Patient is a 25 year old woman who underwent robotic-assisted laparoscopic ventral and umbilical hernia repair with mesh on 8/12/2020 for incarcerated ventral hernia here for postoperative visit. Of note, intraoperatively patient was found to have a liver mass. Patient was admitted postoperatively for pain control and underwent attempted MRI but at the time refused contrast and so lesion was not appropriately characterized. Currently the patient states that her postoperative pain is now controlled with OTC medications, and she is having the most discomfort at the borders of where the mesh was placed, characterized as a pulled muscle type pain. Otherwise she states she is overall feeling better, denies fever/chills, no N/V/D.\par \par Exam reveals well-healing incision sites, no incisional site hernias, no recurrence of ventral/umbilical hernias, appropriately TTP at incision sites, no rebound/guarding\par \par Reordered MRI with IV contrast for characterization of liver lesion. Patient will follow with me in 1 month to assess pain and for follow up of imaging results. The patient was agreeable to this plan and all her questions were answered.

## 2020-09-14 ENCOUNTER — EMERGENCY (EMERGENCY)
Age: 25
LOS: 1 days | Discharge: HOME | End: 2020-09-14
Attending: EMERGENCY MEDICINE
Payer: MEDICAID

## 2020-09-14 VITALS
TEMPERATURE: 98 F | HEIGHT: 61 IN | RESPIRATION RATE: 16 BRPM | SYSTOLIC BLOOD PRESSURE: 97 MMHG | WEIGHT: 145.06 LBS | OXYGEN SATURATION: 100 % | HEART RATE: 93 BPM | DIASTOLIC BLOOD PRESSURE: 64 MMHG

## 2020-09-14 VITALS
SYSTOLIC BLOOD PRESSURE: 103 MMHG | HEART RATE: 79 BPM | RESPIRATION RATE: 17 BRPM | DIASTOLIC BLOOD PRESSURE: 64 MMHG | TEMPERATURE: 98 F | OXYGEN SATURATION: 100 %

## 2020-09-14 LAB
ANION GAP SERPL CALC-SCNC: 5 MMOL/L — SIGNIFICANT CHANGE UP (ref 5–17)
APPEARANCE UR: ABNORMAL
BASOPHILS # BLD AUTO: 0.03 K/UL — SIGNIFICANT CHANGE UP (ref 0–0.2)
BASOPHILS NFR BLD AUTO: 0.5 % — SIGNIFICANT CHANGE UP (ref 0–2)
BILIRUB UR-MCNC: NEGATIVE — SIGNIFICANT CHANGE UP
BUN SERPL-MCNC: 8 MG/DL — SIGNIFICANT CHANGE UP (ref 7–18)
CALCIUM SERPL-MCNC: 8.7 MG/DL — SIGNIFICANT CHANGE UP (ref 8.4–10.5)
CHLORIDE SERPL-SCNC: 105 MMOL/L — SIGNIFICANT CHANGE UP (ref 96–108)
CO2 SERPL-SCNC: 27 MMOL/L — SIGNIFICANT CHANGE UP (ref 22–31)
COLOR SPEC: ABNORMAL
CREAT SERPL-MCNC: 0.72 MG/DL — SIGNIFICANT CHANGE UP (ref 0.5–1.3)
DIFF PNL FLD: ABNORMAL
EOSINOPHIL # BLD AUTO: 0.11 K/UL — SIGNIFICANT CHANGE UP (ref 0–0.5)
EOSINOPHIL NFR BLD AUTO: 1.7 % — SIGNIFICANT CHANGE UP (ref 0–6)
GLUCOSE SERPL-MCNC: 113 MG/DL — HIGH (ref 70–99)
GLUCOSE UR QL: NEGATIVE — SIGNIFICANT CHANGE UP
HCG UR QL: NEGATIVE — SIGNIFICANT CHANGE UP
HCT VFR BLD CALC: 33.9 % — LOW (ref 34.5–45)
HGB BLD-MCNC: 10.6 G/DL — LOW (ref 11.5–15.5)
HIV 1 & 2 AB SERPL IA.RAPID: SIGNIFICANT CHANGE UP
IMM GRANULOCYTES NFR BLD AUTO: 0.3 % — SIGNIFICANT CHANGE UP (ref 0–1.5)
KETONES UR-MCNC: NEGATIVE — SIGNIFICANT CHANGE UP
LEUKOCYTE ESTERASE UR-ACNC: ABNORMAL
LYMPHOCYTES # BLD AUTO: 1.77 K/UL — SIGNIFICANT CHANGE UP (ref 1–3.3)
LYMPHOCYTES # BLD AUTO: 27.2 % — SIGNIFICANT CHANGE UP (ref 13–44)
MCHC RBC-ENTMCNC: 27.6 PG — SIGNIFICANT CHANGE UP (ref 27–34)
MCHC RBC-ENTMCNC: 31.3 GM/DL — LOW (ref 32–36)
MCV RBC AUTO: 88.3 FL — SIGNIFICANT CHANGE UP (ref 80–100)
MONOCYTES # BLD AUTO: 0.63 K/UL — SIGNIFICANT CHANGE UP (ref 0–0.9)
MONOCYTES NFR BLD AUTO: 9.7 % — SIGNIFICANT CHANGE UP (ref 2–14)
NEUTROPHILS # BLD AUTO: 3.95 K/UL — SIGNIFICANT CHANGE UP (ref 1.8–7.4)
NEUTROPHILS NFR BLD AUTO: 60.6 % — SIGNIFICANT CHANGE UP (ref 43–77)
NITRITE UR-MCNC: NEGATIVE — SIGNIFICANT CHANGE UP
NRBC # BLD: 0 /100 WBCS — SIGNIFICANT CHANGE UP (ref 0–0)
PH UR: 8 — SIGNIFICANT CHANGE UP (ref 5–8)
PLATELET # BLD AUTO: 203 K/UL — SIGNIFICANT CHANGE UP (ref 150–400)
POTASSIUM SERPL-MCNC: 3.9 MMOL/L — SIGNIFICANT CHANGE UP (ref 3.5–5.3)
POTASSIUM SERPL-SCNC: 3.9 MMOL/L — SIGNIFICANT CHANGE UP (ref 3.5–5.3)
PROT UR-MCNC: 30 MG/DL
RBC # BLD: 3.84 M/UL — SIGNIFICANT CHANGE UP (ref 3.8–5.2)
RBC # FLD: 14.6 % — HIGH (ref 10.3–14.5)
SODIUM SERPL-SCNC: 137 MMOL/L — SIGNIFICANT CHANGE UP (ref 135–145)
SP GR SPEC: 1.01 — SIGNIFICANT CHANGE UP (ref 1.01–1.02)
UROBILINOGEN FLD QL: NEGATIVE — SIGNIFICANT CHANGE UP
WBC # BLD: 6.51 K/UL — SIGNIFICANT CHANGE UP (ref 3.8–10.5)
WBC # FLD AUTO: 6.51 K/UL — SIGNIFICANT CHANGE UP (ref 3.8–10.5)

## 2020-09-14 PROCEDURE — 81025 URINE PREGNANCY TEST: CPT

## 2020-09-14 PROCEDURE — 81001 URINALYSIS AUTO W/SCOPE: CPT

## 2020-09-14 PROCEDURE — 80048 BASIC METABOLIC PNL TOTAL CA: CPT

## 2020-09-14 PROCEDURE — 96361 HYDRATE IV INFUSION ADD-ON: CPT

## 2020-09-14 PROCEDURE — 74177 CT ABD & PELVIS W/CONTRAST: CPT | Mod: 26

## 2020-09-14 PROCEDURE — 96374 THER/PROPH/DIAG INJ IV PUSH: CPT

## 2020-09-14 PROCEDURE — 86703 HIV-1/HIV-2 1 RESULT ANTBDY: CPT

## 2020-09-14 PROCEDURE — 99284 EMERGENCY DEPT VISIT MOD MDM: CPT | Mod: 25

## 2020-09-14 PROCEDURE — 85025 COMPLETE CBC W/AUTO DIFF WBC: CPT

## 2020-09-14 PROCEDURE — 36415 COLL VENOUS BLD VENIPUNCTURE: CPT

## 2020-09-14 PROCEDURE — 99284 EMERGENCY DEPT VISIT MOD MDM: CPT

## 2020-09-14 PROCEDURE — 74177 CT ABD & PELVIS W/CONTRAST: CPT

## 2020-09-14 RX ORDER — KETOROLAC TROMETHAMINE 30 MG/ML
30 SYRINGE (ML) INJECTION ONCE
Refills: 0 | Status: DISCONTINUED | OUTPATIENT
Start: 2020-09-14 | End: 2020-09-14

## 2020-09-14 RX ORDER — IBUPROFEN 200 MG
1 TABLET ORAL
Qty: 20 | Refills: 0
Start: 2020-09-14

## 2020-09-14 RX ORDER — SODIUM CHLORIDE 9 MG/ML
3 INJECTION INTRAMUSCULAR; INTRAVENOUS; SUBCUTANEOUS EVERY 8 HOURS
Refills: 0 | Status: DISCONTINUED | OUTPATIENT
Start: 2020-09-14 | End: 2020-09-17

## 2020-09-14 RX ORDER — SODIUM CHLORIDE 9 MG/ML
1000 INJECTION INTRAMUSCULAR; INTRAVENOUS; SUBCUTANEOUS ONCE
Refills: 0 | Status: COMPLETED | OUTPATIENT
Start: 2020-09-14 | End: 2020-09-14

## 2020-09-14 RX ORDER — METHOCARBAMOL 500 MG/1
2 TABLET, FILM COATED ORAL
Qty: 40 | Refills: 0
Start: 2020-09-14 | End: 2020-09-18

## 2020-09-14 NOTE — ED PROVIDER NOTE - CLINICAL SUMMARY MEDICAL DECISION MAKING FREE TEXT BOX
No evidence of perirectal or perianal abscess. No infiltration of the gluteal subcutaneous fat or skin thickening.  Postsurgical changes related to recent hernia repair. Fluid collection along the right ventral abdominal wall measuring up to 8.6 x 1.6 x 8.2 cm.  415a- Pt reevalatued, in in no distress, ambulating in ED. Pt has no abdominal pain, no guarding to repeat abdominal palpation, able to eat and drink without vomiting. Pt will f/u with her surgeon Dr. Toledo. Pt is well appearing, has no new complaints and able to walk with normal gait. Pt is stable for discharge and follow up with medical doctor. Pt educated on care and need for follow up. Discussed anticipatory guidance and return precautions. Questions answered. I had a detailed discussion with the patient and/or guardian regarding the historical points, exam findings, and any diagnostic results supporting the discharge diagnosis.

## 2020-09-14 NOTE — ED ADULT NURSE NOTE - CHPI ED NUR SYMPTOMS NEG
no bladder dysfunction/no anorexia/no fatigue/no bowel dysfunction/no constipation/no difficulty bearing weight/no motor function loss/no numbness/no tingling/no neck tenderness

## 2020-09-14 NOTE — ED PROVIDER NOTE - NSFOLLOWUPINSTRUCTIONS_ED_ALL_ED_FT
Return to ER immediately if you have abdominal pain, if you have fever, vomiting,  blood in stools or you have black stools, unable to eat or drink, have worsening/persistent diarrhea or constipation, any concerns. See your doctor as soon as possible (within 1-2 days).   If you need further assistance for appointments you can contact the [place] Care Coordinator at [phone]. In addition our outpatient [facility] is located at [address] [place] [address], tele: [phone].   Acute Low Back Pain    AMBULATORY CARE:    Acute low back pain is sudden discomfort in your lower back area that lasts for up to 6 weeks. The discomfort makes it difficult to tolerate activity.     Common symptoms include the following:   •Back stiffness or spasms      •Pain down the back or side of one leg      •Holding yourself in an unusual position or posture to decrease your back pain      •Not being able to find a sitting position that is comfortable      •Slow increase in your pain for 24 to 48 hours after you stress your back      •Tenderness on your lower back or severe pain when you move your back      Seek care immediately if:   •You have severe pain.      •You have sudden stiffness and heaviness on both buttocks down to both legs.      •You have numbness or weakness in one leg, or pain in both legs.      •You have numbness in your genital area or across your lower back.      •You cannot control your urine or bowel movements.      Contact your healthcare provider if:   •You have a fever.      •You have pain at night or when you rest.      •Your pain does not get better with treatment.      •You have pain that worsens when you cough or sneeze.      •You suddenly feel something pop or snap in your back.      •You have questions or concerns about your condition or care.      The goal of treatment for acute low back pain is to relieve your pain and help you tolerate activity. Most people with acute lower back pain get better within 4 to 6 weeks. You may need any of the following:   •NSAIDs help decrease swelling and pain. This medicine is available with or without a doctor's order. NSAIDs can cause stomach bleeding or kidney problems in certain people. If you take blood thinner medicine, always ask your healthcare provider if NSAIDs are safe for you. Always read the medicine label and follow directions.      •Acetaminophen decreases pain and fever. It is available without a doctor's order. Ask how much to take and how often to take it. Follow directions. Read the labels of all other medicines you are using to see if they also contain acetaminophen, or ask your doctor or pharmacist. Acetaminophen can cause liver damage if not taken correctly. Do not use more than 4 grams (4,000 milligrams) total of acetaminophen in one day.       •Muscle relaxers decrease pain by relaxing the muscles in your lower spine.      •Prescription pain medicine may be given. Ask your healthcare provider how to take this medicine safely. Some prescription pain medicines contain acetaminophen. Do not take other medicines that contain acetaminophen without talking to your healthcare provider. Too much acetaminophen may cause liver damage. Prescription pain medicine may cause constipation. Ask your healthcare provider how to prevent or treat constipation.       Manage your symptoms:   •Stay active as much as you can without causing more pain. Bed rest could make your back pain worse. Start with some light exercises such as walking. Avoid heavy lifting until your pain is gone. Ask for more information about the activities or exercises that are right for you.       •Apply ice on your back for 15 to 20 minutes every hour or as directed. Use an ice pack, or put crushed ice in a plastic bag. Cover it with a towel before you apply it to your skin. Ice helps prevent tissue damage and decreases swelling and pain.       •Apply heat on your back for 20 to 30 minutes every 2 hours for as many days as directed. Heat helps decrease pain and muscle spasms. Alternate heat and ice.      Prevent acute low back pain:   •Use proper body mechanics. ?Bend at the hips and knees when you  objects. Do not bend from the waist. Use your leg muscles as you lift the load. Do not use your back. Keep the object close to your chest as you lift it. Try not to twist or lift anything above your waist.      ?Change your position often when you stand for long periods of time. Rest one foot on a small box or footrest, and then switch to the other foot often.      ?Try not to sit for long periods of time. When you do, sit in a straight-backed chair with your feet flat on the floor. Never reach, pull, or push while you are sitting.      •Do exercises that strengthen your back muscles. Warm up before you exercise. Ask your healthcare provider the best exercises for you.      •Maintain a healthy weight. Ask your healthcare provider how much you should weigh. Ask him to help you create a weight loss plan if you are overweight.      Follow up with your healthcare provider as directed: Return for a follow-up visit if you still have pain after 1 to 3 weeks of treatment. You may need to visit an orthopedist if your back pain lasts longer than 12 weeks. Write down your questions so you remember to ask them during your visits.

## 2020-09-14 NOTE — ED PROVIDER NOTE - CHPI ED SYMPTOMS NEG
no anorexia/no bowel dysfunction/no difficulty bearing weight/no numbness/no fatigue/no neck tenderness/no bladder dysfunction/no constipation

## 2020-09-14 NOTE — ED PROVIDER NOTE - OBJECTIVE STATEMENT
Chief complaint of tenderness to mid upper gluteal area since 8am. Denies trauma No blood per rectum. Pt currently menstruating.  PSHX: s/p laparoscopic hernia repair 3 weeks ago at [facility].

## 2020-09-14 NOTE — ED PROVIDER NOTE - PHYSICAL EXAMINATION
+Pilonidal area tenderness, no erythema or palpable mass. +Pilonidal area tenderness, no erythema or palpable mass.  No saddle anesthesia, B/L knee, pedal and EHL flex and ext intact, normal gait, no foot drop. Truncal flexion and extension intact.

## 2020-09-14 NOTE — ED PROVIDER NOTE - PATIENT PORTAL LINK FT
You can access the FollowMyHealth Patient Portal offered by [facility] by registering at the following website: [email or web address]. By joining [facility]’s FollowMyHealth portal, you will also be able to view your health information using other applications (apps) compatible with our system.

## 2020-09-15 ENCOUNTER — APPOINTMENT (OUTPATIENT)
Dept: MRI IMAGING | Facility: IMAGING CENTER | Age: 25
End: 2020-09-15

## 2020-09-23 PROBLEM — Z78.9 OTHER SPECIFIED HEALTH STATUS: Chronic | Status: ACTIVE | Noted: 2020-09-14

## 2020-10-01 ENCOUNTER — OUTPATIENT (OUTPATIENT)
Dept: OUTPATIENT SERVICES | Facility: HOSPITAL | Age: 25
LOS: 1 days | End: 2020-10-01
Payer: MEDICAID

## 2020-10-01 ENCOUNTER — APPOINTMENT (OUTPATIENT)
Dept: MRI IMAGING | Facility: CLINIC | Age: 25
End: 2020-10-01
Payer: MEDICAID

## 2020-10-01 ENCOUNTER — RESULT REVIEW (OUTPATIENT)
Age: 25
End: 2020-10-01

## 2020-10-01 DIAGNOSIS — R16.0 HEPATOMEGALY, NOT ELSEWHERE CLASSIFIED: ICD-10-CM

## 2020-10-01 PROCEDURE — A9581: CPT

## 2020-10-01 PROCEDURE — 74183 MRI ABD W/O CNTR FLWD CNTR: CPT

## 2020-10-01 PROCEDURE — 74183 MRI ABD W/O CNTR FLWD CNTR: CPT | Mod: 26

## 2020-10-13 ENCOUNTER — APPOINTMENT (OUTPATIENT)
Dept: SURGERY | Facility: CLINIC | Age: 25
End: 2020-10-13
Payer: MEDICAID

## 2020-10-13 VITALS
WEIGHT: 140 LBS | SYSTOLIC BLOOD PRESSURE: 121 MMHG | OXYGEN SATURATION: 100 % | BODY MASS INDEX: 23.61 KG/M2 | DIASTOLIC BLOOD PRESSURE: 80 MMHG | HEIGHT: 64.5 IN | TEMPERATURE: 97.9 F | HEART RATE: 103 BPM

## 2020-10-13 PROCEDURE — 99024 POSTOP FOLLOW-UP VISIT: CPT

## 2020-10-13 NOTE — HISTORY OF PRESENT ILLNESS
[de-identified] : Since last seen patient underwent MRI of abdomen on 10/1/2020 which demonstrates a 3.4 x 2.7 cm segment 3 lesion focal nodular hyperplasia. Patient states that otherwise the surgical site pain has improved, no longer requires pain medication, she is having normal diet and urinary/bowel habits, no fever/chills.\par \par Exam reveals well-healing incision sites, no signs of incisional hernia, non-TTP in all quadrants, no rebound/guarding\par \par Patient is healing well and imaging demonstrates a segment 3 lesion consistent with focal nodular hyperplasia. Patient will follow with me in 6 months and was advised to slowly ease into regular activity, and if she has signs or symptoms of hernia recurrence, to immediately call the office. Patient was agreeable to this plan and all her questions were answered.

## 2021-03-23 ENCOUNTER — APPOINTMENT (OUTPATIENT)
Dept: SURGERY | Facility: CLINIC | Age: 26
End: 2021-03-23
Payer: MEDICAID

## 2021-03-23 VITALS
HEART RATE: 84 BPM | WEIGHT: 146.5 LBS | TEMPERATURE: 97.9 F | SYSTOLIC BLOOD PRESSURE: 117 MMHG | OXYGEN SATURATION: 100 % | DIASTOLIC BLOOD PRESSURE: 77 MMHG | BODY MASS INDEX: 24.71 KG/M2 | HEIGHT: 64.5 IN

## 2021-03-23 PROCEDURE — 99072 ADDL SUPL MATRL&STAF TM PHE: CPT

## 2021-03-23 PROCEDURE — 99213 OFFICE O/P EST LOW 20 MIN: CPT

## 2021-03-23 NOTE — PLAN
[FreeTextEntry1] : \par - Patient doing well and no signs of hernia recurrence\par - Patient to follow with PCP regarding finding of FNH\par - Patient advised that should she have signs/symptoms concerning for recurrence to return to the office or go to ED, to which the patient stated she understood and was agreeable to

## 2021-03-23 NOTE — PHYSICAL EXAM
[Respiratory Effort] : normal respiratory effort [Normal Rate and Rhythm] : normal rate and rhythm [Alert] : alert [Oriented to Person] : oriented to person [Oriented to Place] : oriented to place [Oriented to Time] : oriented to time [Calm] : calm [de-identified] : NAD [de-identified] : NCAT [de-identified] : Well-healed incision site, no recurrence of ventral hernia, no port-site hernias, non-TTP in all quadrants, no rebound/guarding [de-identified] : No skin changes over abdominal wall

## 2021-03-23 NOTE — HISTORY OF PRESENT ILLNESS
[de-identified] : Patient was last seen in October 2020 and presents for follow up due to enlisting in the [employer]. Since last visit, states that she feels fully recovered, denies pain at the incision sites, no signs of hernia recurrence. She states that she attempted to make an appointment with her PCP for follow up of the [facility]. No fever/chills, no N/V, tolerating diet well and having normal urinary/bowel habits. [de-identified] : S

## 2021-03-23 NOTE — ASSESSMENT
[FreeTextEntry1] : 25 year old woman s/p robotic-assisted laparoscopic ventral and umbilical hernia repair with mesh in August 2020, healing without incident and fully recovered at this time

## 2021-06-05 ENCOUNTER — APPOINTMENT (OUTPATIENT)
Dept: INTERNAL MEDICINE | Facility: CLINIC | Age: 26
End: 2021-06-05
Payer: MEDICAID

## 2021-06-05 ENCOUNTER — NON-APPOINTMENT (OUTPATIENT)
Age: 26
End: 2021-06-05

## 2021-06-05 VITALS
HEIGHT: 64.5 IN | TEMPERATURE: 97.9 F | RESPIRATION RATE: 16 BRPM | DIASTOLIC BLOOD PRESSURE: 69 MMHG | WEIGHT: 156 LBS | OXYGEN SATURATION: 99 % | SYSTOLIC BLOOD PRESSURE: 103 MMHG | BODY MASS INDEX: 26.31 KG/M2 | HEART RATE: 83 BPM

## 2021-06-05 DIAGNOSIS — Z83.438 FAMILY HISTORY OF OTHER DISORDER OF LIPOPROTEIN METABOLISM AND OTHER LIPIDEMIA: ICD-10-CM

## 2021-06-05 DIAGNOSIS — Z80.0 FAMILY HISTORY OF MALIGNANT NEOPLASM OF DIGESTIVE ORGANS: ICD-10-CM

## 2021-06-05 DIAGNOSIS — Z82.3 FAMILY HISTORY OF STROKE: ICD-10-CM

## 2021-06-05 PROCEDURE — XXXXX: CPT

## 2021-06-05 RX ORDER — CYCLOBENZAPRINE HYDROCHLORIDE 5 MG/1
5 TABLET, FILM COATED ORAL
Qty: 30 | Refills: 0 | Status: DISCONTINUED | COMMUNITY
Start: 2020-08-25 | End: 2021-06-05

## 2021-06-08 ENCOUNTER — RESULT REVIEW (OUTPATIENT)
Age: 26
End: 2021-06-08

## 2021-06-14 LAB
ALBUMIN SERPL ELPH-MCNC: 4.4 G/DL
ALP BLD-CCNC: 54 U/L
ALT SERPL-CCNC: 14 U/L
ANION GAP SERPL CALC-SCNC: 13 MMOL/L
AST SERPL-CCNC: 11 U/L
BASOPHILS # BLD AUTO: 0.04 K/UL
BASOPHILS NFR BLD AUTO: 0.8 %
BILIRUB SERPL-MCNC: <0.2 MG/DL
BUN SERPL-MCNC: 6 MG/DL
CALCIUM SERPL-MCNC: 9.3 MG/DL
CHLORIDE SERPL-SCNC: 106 MMOL/L
CHOLEST SERPL-MCNC: 206 MG/DL
CO2 SERPL-SCNC: 22 MMOL/L
CREAT SERPL-MCNC: 0.62 MG/DL
EOSINOPHIL # BLD AUTO: 0.09 K/UL
EOSINOPHIL NFR BLD AUTO: 1.9 %
GLUCOSE SERPL-MCNC: 96 MG/DL
HBV SURFACE AB SER QL: NONREACTIVE
HBV SURFACE AG SER QL: NONREACTIVE
HCG SERPL QL: NEGATIVE
HCT VFR BLD CALC: 37.2 %
HCV AB SER QL: NONREACTIVE
HCV S/CO RATIO: 0.1 S/CO
HDLC SERPL-MCNC: 50 MG/DL
HEMOCCULT STL QL: NEGATIVE
HGB BLD-MCNC: 10.7 G/DL
HIV1+2 AB SPEC QL IA.RAPID: NONREACTIVE
HSV 1+2 IGG SER IA-IMP: NEGATIVE
HSV 1+2 IGG SER IA-IMP: POSITIVE
HSV1 IGG SER QL: 0.18 INDEX
HSV1 IGM SER QL: NORMAL TITER
HSV2 AB FLD-ACNC: NORMAL TITER
HSV2 IGG SER QL: 14.4 INDEX
IMM GRANULOCYTES NFR BLD AUTO: 0.4 %
LDLC SERPL CALC-MCNC: 135 MG/DL
LYMPHOCYTES # BLD AUTO: 1.75 K/UL
LYMPHOCYTES NFR BLD AUTO: 36.5 %
MAN DIFF?: NORMAL
MCHC RBC-ENTMCNC: 25.4 PG
MCHC RBC-ENTMCNC: 28.8 GM/DL
MCV RBC AUTO: 88.4 FL
MONOCYTES # BLD AUTO: 0.44 K/UL
MONOCYTES NFR BLD AUTO: 9.2 %
NEUTROPHILS # BLD AUTO: 2.45 K/UL
NEUTROPHILS NFR BLD AUTO: 51.2 %
NONHDLC SERPL-MCNC: 157 MG/DL
PAPP-A SERPL-ACNC: <1 MIU/ML
PLATELET # BLD AUTO: 296 K/UL
POTASSIUM SERPL-SCNC: 4.5 MMOL/L
PROT SERPL-MCNC: 7.1 G/DL
RBC # BLD: 4.21 M/UL
RBC # FLD: 15.1 %
SODIUM SERPL-SCNC: 140 MMOL/L
T PALLIDUM AB SER QL IA: NEGATIVE
TRIGL SERPL-MCNC: 109 MG/DL
TSH SERPL-ACNC: 1.86 UIU/ML
WBC # FLD AUTO: 4.79 K/UL

## 2021-06-14 NOTE — HEALTH RISK ASSESSMENT
[] : Yes [Yes] : Yes [Monthly or less (1 pt)] : Monthly or less (1 point) [1 or 2 (0 pts)] : 1 or 2 (0 points) [Never (0 pts)] : Never (0 points) [No] : In the past 12 months have you used drugs other than those required for medical reasons? No [Patient reported PAP Smear was normal] : Patient reported PAP Smear was normal [0] : 2) Feeling down, depressed, or hopeless: Not at all (0) [HIV Test offered] : HIV Test offered [Hepatitis C test offered] : Hepatitis C test offered [With Family] : lives with family [Employed] : employed [] :  [Sexually Active] : sexually active [Feels Safe at Home] : Feels safe at home [de-identified] : since age 15; 1/4 to 1 pack a day;  [[id number]] : 0 [Change in mental status noted] : No change in mental status noted [Language] : denies difficulty with language [Behavior] : denies difficulty with behavior [Learning/Retaining New Information] : denies difficulty learning/retaining new information [Handling Complex Tasks] : denies difficulty handling complex tasks [Reasoning] : denies difficulty with reasoning [Spatial Ability and Orientation] : denies difficulty with spatial ability and orientation [High Risk Behavior] : no high risk behavior [PapSmearDate] : 01/2019 [ColonoscopyComments] : never;  [FreeTextEntry2] [facility]  [de-identified] : nino

## 2021-06-14 NOTE — REVIEW OF SYSTEMS
[Suicidal] : not suicidal [Insomnia] : no insomnia [Anxiety] : no anxiety [Depression] : no depression [Negative] : Integumentary [FreeTextEntry7] : as per HPI [de-identified] : as per HPI

## 2021-06-14 NOTE — PHYSICAL EXAM
[No Acute Distress] : no acute distress [Well Nourished] : well nourished [Well Developed] : well developed [Well-Appearing] : well-appearing [Normal Sclera/Conjunctiva] : normal sclera/conjunctiva [PERRL] : pupils equal round and reactive to light [EOMI] : extraocular movements intact [Normal Outer Ear/Nose] : the outer ears and nose were normal in appearance [No JVD] : no jugular venous distention [Normal Oropharynx] : the oropharynx was normal [No Lymphadenopathy] : no lymphadenopathy [Thyroid Normal, No Nodules] : the thyroid was normal and there were no nodules present [Supple] : supple [No Respiratory Distress] : no respiratory distress  [No Accessory Muscle Use] : no accessory muscle use [Normal Rate] : normal rate  [Clear to Auscultation] : lungs were clear to auscultation bilaterally [Regular Rhythm] : with a regular rhythm [Normal S1, S2] : normal S1 and S2 [No Murmur] : no murmur heard [Pedal Pulses Present] : the pedal pulses are present [No Edema] : there was no peripheral edema [Soft] : abdomen soft [Non Tender] : non-tender [Non-distended] : non-distended [No Masses] : no abdominal mass palpated [Normal Bowel Sounds] : normal bowel sounds [Normal Posterior Cervical Nodes] : no posterior cervical lymphadenopathy [Normal Anterior Cervical Nodes] : no anterior cervical lymphadenopathy [No CVA Tenderness] : no CVA  tenderness [No Spinal Tenderness] : no spinal tenderness [No Joint Swelling] : no joint swelling [Grossly Normal Strength/Tone] : grossly normal strength/tone [No Rash] : no rash [Coordination Grossly Intact] : coordination grossly intact [No Focal Deficits] : no focal deficits [Normal Gait] : normal gait [Deep Tendon Reflexes (DTR)] : deep tendon reflexes were 2+ and symmetric [Speech Grossly Normal] : speech grossly normal [Memory Grossly Normal] : memory grossly normal [Normal Affect] : the affect was normal [Alert and Oriented x3] : oriented to person, place, and time [Normal Mood] : the mood was normal [Normal Insight/Judgement] : insight and judgment were intact

## 2021-06-14 NOTE — HISTORY OF PRESENT ILLNESS
[de-identified] : 26 y.o F w/pmhX OF FNH, ventral hernia s/p repair, smoking, Hemorrhoid; HLD  today for establish care and annual check up \par \par \par saw general surgery and had MRI done in 10/2020 showed FNH; current HAS IUD\par \par last time had pap was in 2019; with wnL; \par \par SAW General Surgery IN 03/2021; RECOVERY [employer]; WILL SEE SURGERY AGAIN BEFORE she start [employer]; \par \par Denies OF pregnancy OR BREAST FEEDING; \par \par HA : since 14 y.o;  intermittent; but daily; no waking up at night; it usually last about 30 mintis;  worsening during her period; b/l temporal; no trauma or injury; no change of quarterly; no photophobia or sonophobia; no fever or chills; no n/v;   Denies OF SEIZURE HX; no dizziness; the pain was very bad and cause she passing out; no focal deficient;  the pain is not thunder clap; but fell like throbbing ha; no vision change; FHx of migraine of sister the ah was 10/10 and pt passing out 3 weeks ago due to the HA; mother have CVA possible hemorrhagic not sure whether has aneurysm of her mother; now mother is paralyzed;\par \par denies of any HA/CP/SOB/Palpitation\par ABLE TO CLIMB 4 FLIGHT OF STARIS \par \par taking tylenols and Ibuprohen had some temparary relieve; \par \par \par smoking since 15 y.o; about a pack a day; before; no quarter of pack a day; want to try to quit smoking; \par \par STILL REPORTED Intermittent GI BLEEDING WHEN SHE WIPE; BM ONCE EVERY 3 DAYS; NO EATING FIBER; STOOL IS HARD; NO RECTAL PAIN; NO DRINKING WATER; \par \par NO Exercise;

## 2021-06-14 NOTE — ASSESSMENT
[FreeTextEntry1] : Decline CVODI19 SHOT  \par \par \par refer to psychiatry to r/o ADHD\par \par \par

## 2021-06-27 ENCOUNTER — OUTPATIENT (OUTPATIENT)
Dept: OUTPATIENT SERVICES | Facility: HOSPITAL | Age: 26
LOS: 1 days | End: 2021-06-27
Payer: MEDICAID

## 2021-06-27 ENCOUNTER — APPOINTMENT (OUTPATIENT)
Dept: MRI IMAGING | Facility: CLINIC | Age: 26
End: 2021-06-27
Payer: MEDICAID

## 2021-06-27 DIAGNOSIS — R51.9 HEADACHE, UNSPECIFIED: ICD-10-CM

## 2021-06-27 PROCEDURE — A9585: CPT

## 2021-06-27 PROCEDURE — 74183 MRI ABD W/O CNTR FLWD CNTR: CPT | Mod: 26

## 2021-06-27 PROCEDURE — 74183 MRI ABD W/O CNTR FLWD CNTR: CPT

## 2021-06-30 ENCOUNTER — OUTPATIENT (OUTPATIENT)
Dept: OUTPATIENT SERVICES | Facility: HOSPITAL | Age: 26
LOS: 1 days | End: 2021-06-30
Payer: MEDICAID

## 2021-06-30 ENCOUNTER — APPOINTMENT (OUTPATIENT)
Dept: MRI IMAGING | Facility: CLINIC | Age: 26
End: 2021-06-30
Payer: MEDICAID

## 2021-06-30 DIAGNOSIS — R51.9 HEADACHE, UNSPECIFIED: ICD-10-CM

## 2021-06-30 PROCEDURE — 70553 MRI BRAIN STEM W/O & W/DYE: CPT

## 2021-06-30 PROCEDURE — 70546 MR ANGIOGRAPH HEAD W/O&W/DYE: CPT | Mod: 26,76,59

## 2021-06-30 PROCEDURE — 70553 MRI BRAIN STEM W/O & W/DYE: CPT | Mod: 26

## 2021-06-30 PROCEDURE — 70546 MR ANGIOGRAPH HEAD W/O&W/DYE: CPT

## 2021-06-30 PROCEDURE — A9585: CPT

## 2021-07-06 ENCOUNTER — APPOINTMENT (OUTPATIENT)
Dept: OBGYN | Facility: CLINIC | Age: 26
End: 2021-07-06
Payer: MEDICAID

## 2021-07-06 VITALS
WEIGHT: 155 LBS | BODY MASS INDEX: 26.14 KG/M2 | HEART RATE: 77 BPM | SYSTOLIC BLOOD PRESSURE: 112 MMHG | DIASTOLIC BLOOD PRESSURE: 71 MMHG | HEIGHT: 64.5 IN | TEMPERATURE: 98.2 F

## 2021-07-06 PROCEDURE — 99204 OFFICE O/P NEW MOD 45 MIN: CPT

## 2021-07-06 RX ORDER — MAGNESIUM OXIDE 241.3 MG/1000MG
400 TABLET ORAL DAILY
Qty: 30 | Refills: 2 | Status: COMPLETED | COMMUNITY
Start: 2021-06-05 | End: 2021-07-06

## 2021-07-06 NOTE — HISTORY OF PRESENT ILLNESS
[Irregular Menstrual Interval] : irregular menstrual interval [Abnormal Duration] : abnormal duration [___ Months] : [unfilled] months [Staining] : staining [Normal Amount/Duration] :  normal amount and duration [Regular Cycle Intervals] : periods have been regular [Menarche Age: ____] : age at menarche was [unfilled] [FreeTextEntry1] : 07/02/2021 [Currently Active] : currently active [Men] : men [Vaginal] : vaginal [No] : No

## 2021-07-06 NOTE — PHYSICAL EXAM
[Appropriately responsive] : appropriately responsive [Alert] : alert [No Acute Distress] : no acute distress [No Lymphadenopathy] : no lymphadenopathy [Regular Rate Rhythm] : regular rate rhythm [No Murmurs] : no murmurs [Clear to Auscultation B/L] : clear to auscultation bilaterally [Soft] : soft [Non-tender] : non-tender [Non-distended] : non-distended [No HSM] : No HSM [No Lesions] : no lesions [No Mass] : no mass [Oriented x3] : oriented x3 [Examination Of The Breasts] : a normal appearance [No Masses] : no breast masses were palpable [Discharge] : discharge [Scant] : scant [White] : white [Blood-Tinged] : blood-tinged [Normal] : normal [Normal Position] : in a normal position [Uterine Adnexae] : normal

## 2021-07-07 LAB
C TRACH RRNA SPEC QL NAA+PROBE: NOT DETECTED
HPV HIGH+LOW RISK DNA PNL CVX: NOT DETECTED
N GONORRHOEA RRNA SPEC QL NAA+PROBE: NOT DETECTED
SOURCE TP AMPLIFICATION: NORMAL

## 2021-07-08 LAB
CANDIDA VAG CYTO: NOT DETECTED
G VAGINALIS+PREV SP MTYP VAG QL MICRO: NOT DETECTED
T VAGINALIS VAG QL WET PREP: NOT DETECTED

## 2021-07-12 LAB — CYTOLOGY CVX/VAG DOC THIN PREP: NORMAL

## 2021-07-14 ENCOUNTER — APPOINTMENT (OUTPATIENT)
Dept: ULTRASOUND IMAGING | Facility: HOSPITAL | Age: 26
End: 2021-07-14
Payer: MEDICAID

## 2021-07-14 ENCOUNTER — OUTPATIENT (OUTPATIENT)
Dept: OUTPATIENT SERVICES | Facility: HOSPITAL | Age: 26
LOS: 1 days | End: 2021-07-14
Payer: MEDICAID

## 2021-07-14 DIAGNOSIS — N93.0 POSTCOITAL AND CONTACT BLEEDING: ICD-10-CM

## 2021-07-14 PROCEDURE — 76830 TRANSVAGINAL US NON-OB: CPT

## 2021-07-14 PROCEDURE — 76856 US EXAM PELVIC COMPLETE: CPT

## 2021-07-14 PROCEDURE — 76856 US EXAM PELVIC COMPLETE: CPT | Mod: 26

## 2021-07-14 PROCEDURE — 76830 TRANSVAGINAL US NON-OB: CPT | Mod: 26

## 2021-09-29 ENCOUNTER — APPOINTMENT (OUTPATIENT)
Dept: INTERNAL MEDICINE | Facility: CLINIC | Age: 26
End: 2021-09-29
Payer: MEDICAID

## 2021-09-29 VITALS
HEART RATE: 83 BPM | RESPIRATION RATE: 16 BRPM | OXYGEN SATURATION: 99 % | BODY MASS INDEX: 25.8 KG/M2 | DIASTOLIC BLOOD PRESSURE: 77 MMHG | TEMPERATURE: 98.6 F | SYSTOLIC BLOOD PRESSURE: 110 MMHG | HEIGHT: 64.5 IN | WEIGHT: 153 LBS

## 2021-09-29 PROCEDURE — 90746 HEPB VACCINE 3 DOSE ADULT IM: CPT

## 2021-09-29 PROCEDURE — 99214 OFFICE O/P EST MOD 30 MIN: CPT | Mod: 25

## 2021-09-29 PROCEDURE — G0010: CPT

## 2021-09-29 NOTE — REVIEW OF SYSTEMS
[Negative] : Integumentary [Suicidal] : not suicidal [Insomnia] : no insomnia [Anxiety] : no anxiety [Depression] : no depression [FreeTextEntry7] : as per HPI [de-identified] : as per HPI

## 2021-09-29 NOTE — ASSESSMENT
[FreeTextEntry1] : had covid19 shot x2; moderna; last shot in 08/18/2021 \par \par refer to psychiatry to r/o ADHD\par \par \par still smoking;\par no able to tolerate with nicotine patch due to allergic dermatitis;\par sent gum\par \par  HA likely migraine;\par MRI / MRA/MRV BRAIN WNL;\par fail the supplement PPX meds \par advised PPX meds  and sumatriptan pt decline\par refer to neuro \par \par \par HLD: labs\par healthy diet and exercise; \par \par refer to MRI for monitor FNH in prior MRI in 10/2020\par \par COVID19 PCR TEST FOR HER WORK; Asymptotic NOW \par \par hepb shot #1;\par 1-2 month for hepb shot #2 \par \par refer to GI again; \par \par \par refer to derm for acne

## 2021-09-29 NOTE — HISTORY OF PRESENT ILLNESS
[de-identified] : 26 y.o F w/pmhX OF FNH, ventral hernia s/p repair, smoking, Hemorrhoid; HLD  today for follow up\par \par saw general surgery and had MRI done in 10/2020 showed FNH; current HAS IUD\par \par SAW General Surgery IN 03/2021; RECOVERY [employer]; WILL SEE SURGERY AGAIN BEFORE she start [employer]; \par \par Denies OF pregnancy OR BREAST FEEDING; \par \par HA : since 14 y.o;  intermittent; but daily; no waking up at night; it usually last about 30 mintis;  worsening during her period; b/l temporal; no trauma or injury; no change of quarterly; no photophobia or sonophobia; no fever or chills; no n/v;   Denies OF SEIZURE HX; no dizziness; the pain was very bad and cause she passing out; no focal deficient;  the pain is not thunder clap; but fell like throbbing ha; no vision change; FHx of migraine of sister the ah was 10/10 and pt passing out before due to the HA; mother have CVA possible hemorrhagic not sure whether has aneurysm of her mother; now mother is paralyzed; MRI/MRA/MRV brain ALL WNL; HA now every day; \par no pregnancy or breast feeding;no hX of seizure; \par \par denies of any HA/CP/SOB/Palpitation\par ABLE TO CLIMB 4 FLIGHT OF Stairs \par \par taking tylenols and Ibuprohen had some temporary relieve; \par \par \par smoking since 15 y.o; about a pack a day; before; no quarter of pack a day; want to try to quit smoking; \par \par STILL REPORTED Intermittent GI BLEEDING WHEN SHE WIPE; BM ONCE EVERY 3 DAYS; NO EATING FIBER; STOOL IS HARD; NO RECTAL PAIN; NO DRINKING WATER; has not see GI  \par \par NO Exercise; \par \par pt reported that she feel fatigue and cold in Monday; no fever oR chills or sore throat or running nose or sob 2 days ago; symptoms improved; \par she was asked by her job

## 2021-09-29 NOTE — HEALTH RISK ASSESSMENT
[] : Yes [Yes] : Yes [Monthly or less (1 pt)] : Monthly or less (1 point) [1 or 2 (0 pts)] : 1 or 2 (0 points) [Never (0 pts)] : Never (0 points) [No] : In the past 12 months have you used drugs other than those required for medical reasons? No [0] : 2) Feeling down, depressed, or hopeless: Not at all (0) [Patient reported PAP Smear was normal] : Patient reported PAP Smear was normal [HIV Test offered] : HIV Test offered [Hepatitis C test offered] : Hepatitis C test offered [With Family] : lives with family [Employed] : employed [] :  [Sexually Active] : sexually active [Feels Safe at Home] : Feels safe at home [de-identified] : since age 15; 1/4 to 1 pack a day;  [[id number]] : 0 [Change in mental status noted] : No change in mental status noted [Language] : denies difficulty with language [Behavior] : denies difficulty with behavior [Learning/Retaining New Information] : denies difficulty learning/retaining new information [Handling Complex Tasks] : denies difficulty handling complex tasks [Reasoning] : denies difficulty with reasoning [Spatial Ability and Orientation] : denies difficulty with spatial ability and orientation [High Risk Behavior] : no high risk behavior [PapSmearDate] : 07/2021 [ColonoscopyComments] : never;  [FreeTextEntry2] : [facility]  [de-identified] : nino

## 2021-10-01 ENCOUNTER — NON-APPOINTMENT (OUTPATIENT)
Age: 26
End: 2021-10-01

## 2021-10-01 LAB
ALBUMIN SERPL ELPH-MCNC: 4.1 G/DL
ALP BLD-CCNC: 52 U/L
ALT SERPL-CCNC: 13 U/L
ANION GAP SERPL CALC-SCNC: 12 MMOL/L
AST SERPL-CCNC: 12 U/L
BASOPHILS # BLD AUTO: 0.04 K/UL
BASOPHILS NFR BLD AUTO: 0.6 %
BILIRUB SERPL-MCNC: <0.2 MG/DL
BUN SERPL-MCNC: 8 MG/DL
CALCIUM SERPL-MCNC: 9.3 MG/DL
CHLORIDE SERPL-SCNC: 105 MMOL/L
CHOLEST SERPL-MCNC: 185 MG/DL
CO2 SERPL-SCNC: 23 MMOL/L
CREAT SERPL-MCNC: 0.67 MG/DL
EOSINOPHIL # BLD AUTO: 0.14 K/UL
EOSINOPHIL NFR BLD AUTO: 2.1 %
FERRITIN SERPL-MCNC: 8 NG/ML
GLUCOSE SERPL-MCNC: 89 MG/DL
HCT VFR BLD CALC: 35.9 %
HDLC SERPL-MCNC: 36 MG/DL
HGB BLD-MCNC: 10.4 G/DL
IMM GRANULOCYTES NFR BLD AUTO: 0.3 %
IRON SATN MFR SERPL: 8 %
IRON SERPL-MCNC: 31 UG/DL
LDLC SERPL CALC-MCNC: 113 MG/DL
LYMPHOCYTES # BLD AUTO: 1.91 K/UL
LYMPHOCYTES NFR BLD AUTO: 28.9 %
MAN DIFF?: NORMAL
MCHC RBC-ENTMCNC: 26.1 PG
MCHC RBC-ENTMCNC: 29 GM/DL
MCV RBC AUTO: 90 FL
MONOCYTES # BLD AUTO: 0.61 K/UL
MONOCYTES NFR BLD AUTO: 9.2 %
NEUTROPHILS # BLD AUTO: 3.88 K/UL
NEUTROPHILS NFR BLD AUTO: 58.9 %
NONHDLC SERPL-MCNC: 148 MG/DL
PLATELET # BLD AUTO: 257 K/UL
POTASSIUM SERPL-SCNC: 4.8 MMOL/L
PROT SERPL-MCNC: 6.7 G/DL
RBC # BLD: 3.99 M/UL
RBC # FLD: 15 %
SARS-COV-2 N GENE NPH QL NAA+PROBE: NOT DETECTED
SODIUM SERPL-SCNC: 140 MMOL/L
TIBC SERPL-MCNC: 366 UG/DL
TRANSFERRIN SERPL-MCNC: 333 MG/DL
TRIGL SERPL-MCNC: 177 MG/DL
UIBC SERPL-MCNC: 335 UG/DL
WBC # FLD AUTO: 6.6 K/UL

## 2021-10-20 ENCOUNTER — APPOINTMENT (OUTPATIENT)
Dept: MRI IMAGING | Facility: CLINIC | Age: 26
End: 2021-10-20
Payer: MEDICAID

## 2021-10-20 ENCOUNTER — OUTPATIENT (OUTPATIENT)
Dept: OUTPATIENT SERVICES | Facility: HOSPITAL | Age: 26
LOS: 1 days | End: 2021-10-20
Payer: MEDICAID

## 2021-10-20 DIAGNOSIS — K76.89 OTHER SPECIFIED DISEASES OF LIVER: ICD-10-CM

## 2021-10-20 PROCEDURE — A9581: CPT

## 2021-10-20 PROCEDURE — 74183 MRI ABD W/O CNTR FLWD CNTR: CPT | Mod: 26

## 2021-10-20 PROCEDURE — 74183 MRI ABD W/O CNTR FLWD CNTR: CPT

## 2021-11-03 ENCOUNTER — NON-APPOINTMENT (OUTPATIENT)
Age: 26
End: 2021-11-03

## 2021-11-03 ENCOUNTER — TRANSCRIPTION ENCOUNTER (OUTPATIENT)
Age: 26
End: 2021-11-03

## 2021-11-03 ENCOUNTER — APPOINTMENT (OUTPATIENT)
Dept: INTERNAL MEDICINE | Facility: CLINIC | Age: 26
End: 2021-11-03
Payer: MEDICAID

## 2021-11-03 VITALS
BODY MASS INDEX: 26.31 KG/M2 | HEIGHT: 64.5 IN | SYSTOLIC BLOOD PRESSURE: 120 MMHG | DIASTOLIC BLOOD PRESSURE: 76 MMHG | RESPIRATION RATE: 16 BRPM | OXYGEN SATURATION: 95 % | HEART RATE: 79 BPM | WEIGHT: 156 LBS | TEMPERATURE: 98.1 F

## 2021-11-03 PROCEDURE — 99214 OFFICE O/P EST MOD 30 MIN: CPT | Mod: 25

## 2021-11-03 PROCEDURE — 93000 ELECTROCARDIOGRAM COMPLETE: CPT

## 2021-11-03 NOTE — HEALTH RISK ASSESSMENT
[] : Yes [Yes] : Yes [Monthly or less (1 pt)] : Monthly or less (1 point) [1 or 2 (0 pts)] : 1 or 2 (0 points) [Never (0 pts)] : Never (0 points) [No] : In the past 12 months have you used drugs other than those required for medical reasons? No [0] : 2) Feeling down, depressed, or hopeless: Not at all (0) [Patient reported PAP Smear was normal] : Patient reported PAP Smear was normal [HIV Test offered] : HIV Test offered [Hepatitis C test offered] : Hepatitis C test offered [With Family] : lives with family [Employed] : employed [] :  [Sexually Active] : sexually active [Feels Safe at Home] : Feels safe at home [de-identified] : since age 15; 1/4 to 1 pack a day;  [[id number]] : 0 [Change in mental status noted] : No change in mental status noted [Language] : denies difficulty with language [Behavior] : denies difficulty with behavior [Learning/Retaining New Information] : denies difficulty learning/retaining new information [Handling Complex Tasks] : denies difficulty handling complex tasks [Reasoning] : denies difficulty with reasoning [Spatial Ability and Orientation] : denies difficulty with spatial ability and orientation [High Risk Behavior] : no high risk behavior [PapSmearDate] : 07/2021 [ColonoscopyComments] : never;  [FreeTextEntry2] : [facility]  [de-identified] : nino

## 2021-11-03 NOTE — HISTORY OF PRESENT ILLNESS
[de-identified] : 26 y.o F w/pmhX OF FNH, ventral hernia s/p repair, smoking, Hemorrhoid; HLD  today for follow up\par \par saw general surgery and had MRI done in 10/2020 showed FNH; current HAS IUD\par \par SAW General Surgery IN 03/2021; RECOVERY [employer]; WILL SEE SURGERY AGAIN BEFORE she start [employer]; \par \par Denies OF pregnancy OR BREAST FEEDING; \par \par HA : since 14 y.o;  intermittent; but daily; no waking up at night; it usually last about 30 mintis;  worsening during her period; b/l temporal; no trauma or injury; no change of quarterly; no photophobia or sonophobia; no fever or chills; no n/v;   Denies OF SEIZURE HX; no dizziness; the pain was very bad and cause she passing out; no focal deficient;  the pain is not thunder clap; but fell like throbbing ha; no vision change; FHx of migraine of sister the ah was 10/10 and pt passing out before due to the HA; mother have CVA possible hemorrhagic not sure whether has aneurysm of her mother; now mother is paralyzed; MRI/MRA/MRV brain ALL WNL; HA now every day; \par no pregnancy or breast feeding;no hX of seizure; still has HA 3 times a week; \par \par denies of any HA/CP/SOB/Palpitation\par ABLE TO CLIMB 4 FLIGHT OF Stairs \par \par taking tylenols and Ibuprohen had some temporary relieve; \par \par \par smoking since 15 y.o; about a pack a day; before; no quarter of pack a day; want to try to quit smoking; \par \par STILL REPORTED Intermittent GI BLEEDING WHEN SHE WIPE; BM ONCE EVERY 3 DAYS; NO EATING FIBER; STOOL IS HARD; NO RECTAL PAIN; NO DRINKING WATER; has not see GI  \par \par was taking minocycline from derm and has itchiness; was in urgent care and on benedryal; off meds for 3 days; pt will see derm again in 11/102/201 for acne; \par \par \par denies of any anxiety \par \par also reported enlarged lymph node after taking minocycline\par \par NO Exercise; \par \par pt reported that she feel fatigue and cold in Monday; no fever oR chills or sore throat or running nose or sob 2 days ago; symptoms improved; \par she was asked by her job \par \par has

## 2021-11-03 NOTE — REVIEW OF SYSTEMS
[Negative] : Integumentary [Suicidal] : not suicidal [Insomnia] : no insomnia [Anxiety] : no anxiety [Depression] : no depression [FreeTextEntry7] : as per HPI [de-identified] : as per HPI

## 2021-11-03 NOTE — ASSESSMENT
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 in prior MRI in 10/2020 STABLE \par \par anemia: \par iron supplement;\par labs \par advised to see gi and gyn \par \par Defer hepb shot #2;\par 1-2 month for hepb shot #2 \par \par lymphadenopathy:\par anterior cervical b/l; happened after the new meds;\par likely reactive\par advised to continue monitor\par follow up in 2 months;\par \par sister has breast cancer hx at young age \par check mammo \par \par SEEING  derm for acne \par OFF MINOCYCLINE NOW \par \par advised to see GI as referred AGAIN \par advised to see gyn \par advised to take iron supplement\par \par \par 3 weeks follow up on telephone

## 2021-11-12 ENCOUNTER — TRANSCRIPTION ENCOUNTER (OUTPATIENT)
Age: 26
End: 2021-11-12

## 2021-12-03 ENCOUNTER — APPOINTMENT (OUTPATIENT)
Dept: INTERNAL MEDICINE | Facility: CLINIC | Age: 26
End: 2021-12-03
Payer: MEDICAID

## 2021-12-03 LAB
BASOPHILS # BLD AUTO: 0.04 K/UL
BASOPHILS NFR BLD AUTO: 0.9 %
EOSINOPHIL # BLD AUTO: 0.07 K/UL
EOSINOPHIL NFR BLD AUTO: 1.6 %
FERRITIN SERPL-MCNC: 7 NG/ML
HCT VFR BLD CALC: 37.1 %
HGB BLD-MCNC: 10.5 G/DL
IMM GRANULOCYTES NFR BLD AUTO: 0.2 %
IRON SATN MFR SERPL: 20 %
IRON SERPL-MCNC: 74 UG/DL
LYMPHOCYTES # BLD AUTO: 1.5 K/UL
LYMPHOCYTES NFR BLD AUTO: 34.3 %
MAN DIFF?: NORMAL
MCHC RBC-ENTMCNC: 25.2 PG
MCHC RBC-ENTMCNC: 28.3 GM/DL
MCV RBC AUTO: 89 FL
MONOCYTES # BLD AUTO: 0.57 K/UL
MONOCYTES NFR BLD AUTO: 13 %
NEUTROPHILS # BLD AUTO: 2.18 K/UL
NEUTROPHILS NFR BLD AUTO: 50 %
PLATELET # BLD AUTO: 260 K/UL
RBC # BLD: 4.17 M/UL
RBC # FLD: 15.1 %
TIBC SERPL-MCNC: 365 UG/DL
TRANSFERRIN SERPL-MCNC: 305 MG/DL
UIBC SERPL-MCNC: 291 UG/DL
WBC # FLD AUTO: 4.37 K/UL

## 2021-12-03 PROCEDURE — 99442: CPT

## 2021-12-03 NOTE — HEALTH RISK ASSESSMENT
[] : Yes [Yes] : Yes [Monthly or less (1 pt)] : Monthly or less (1 point) [1 or 2 (0 pts)] : 1 or 2 (0 points) [Never (0 pts)] : Never (0 points) [No] : In the past 12 months have you used drugs other than those required for medical reasons? No [0] : 2) Feeling down, depressed, or hopeless: Not at all (0) [Patient reported PAP Smear was normal] : Patient reported PAP Smear was normal [HIV Test offered] : HIV Test offered [Hepatitis C test offered] : Hepatitis C test offered [With Family] : lives with family [Employed] : employed [] :  [Sexually Active] : sexually active [Feels Safe at Home] : Feels safe at home [de-identified] : since age 15; 1/4 to 1 pack a day;  [[id number]] : 0 [Change in mental status noted] : No change in mental status noted [Language] : denies difficulty with language [Behavior] : denies difficulty with behavior [Learning/Retaining New Information] : denies difficulty learning/retaining new information [Handling Complex Tasks] : denies difficulty handling complex tasks [Reasoning] : denies difficulty with reasoning [Spatial Ability and Orientation] : denies difficulty with spatial ability and orientation [High Risk Behavior] : no high risk behavior [PapSmearDate] : 07/2021 [ColonoscopyComments] : never;  [[facility]TextEntry2[facility]  [de-identified] : nino

## 2021-12-03 NOTE — REVIEW OF SYSTEMS
[Negative] : Integumentary [Suicidal] : not suicidal [Insomnia] : no insomnia [Anxiety] : no anxiety [Depression] : no depression [FreeTextEntry7] : as per HPI [de-identified] : as per HPI

## 2021-12-03 NOTE — HISTORY OF PRESENT ILLNESS
[de-identified] : This visit was provided via TELEPHONE. The patient, CHEN GARZA , was located at home,[address]\par [address]\[place] [address] , at the time of the visit. \par The provider,ROBYN LOW , was located at his medical office located in  at the time of the visit. The patient, and Provider participated in the TELEPHONE\par Verbal consent given on Dec  3 2021 10:30AM by the patient.\par \par \par \par \par 26 y.o F w/pmhX OF FNH, ventral hernia s/p repair, smoking, Hemorrhoid; HLD  today for follow up\par \par saw general surgery and had MRI done in 10/2020 showed FNH; current HAS IUD\par \par SAW General Surgery IN 03/2021; RECOVERY [facility]; WILL SEE SURGERY AGAIN BEFORE she start national [employer]; \par \par Denies OF pregnancy OR BREAST FEEDING; \par \par HA : since 14 y.o;  intermittent; but daily; no waking up at night; it usually last about 30 mintis;  worsening during her period; b/l temporal; no trauma or injury; no change of quarterly; no photophobia or sonophobia; no fever or chills; no n/v;   Denies OF SEIZURE HX; no dizziness; the pain was very bad and cause she passing out; no focal deficient;  the pain is not thunder clap; but fell like throbbing ha; no vision change; FHx of migraine of sister the ah was 10/10 and pt passing out before due to the HA; mother have CVA possible hemorrhagic not sure whether has aneurysm of her mother; now mother is paralyzed; MRI/MRA/MRV brain ALL WNL; HA now every day; \par no pregnancy or breast feeding;no hX of seizure; still has HA 3 times a week; \par now taking ppx amitriptyline with much improvement \par \par denies of any HA/CP/SOB/Palpitation\par ABLE TO CLIMB 4 FLIGHT OF Stairs \par \par taking tylenols and Ibuprohen had some temporary relieve; \par \par \par smoking since 15 y.o; about a pack a day; before; no quarter of pack a day; want to try to quit smoking; \par \par STILL REPORTED Intermittent GI BLEEDING WHEN SHE WIPE; BM ONCE EVERY 3 DAYS; NO EATING FIBER; STOOL IS HARD; NO RECTAL PAIN; NO DRINKING WATER; has not see GI  \par \par was taking minocycline from derm and has itchiness; was in urgent care and on benedryal; off meds for 3 days; pt will see derm again in 11/102/201 for acne; \par \par \par denies of any anxiety \par \par also reported enlarged lymph node after taking minocycline\par \par NO Exercise; \par \par

## 2021-12-03 NOTE — ASSESSMENT
[FreeTextEntry1] : had covid19 shot x2; moderna; last shot in 08/18/2021 \par \par \par decline flu shot \par \par refer to psychiatry to r/o ADHD\par \par still smoking;2 cigerate a day \par no able to tolerate with nicotine patch due to allergic dermatitis;\par sent gum\par \par  HA likely migraine now improved after been on PPX meds \par MRI / MRA/MRV BRAIN WNL;\par fail the supplement PPX meds \par continue with PPX meds which  PT AGREES  now and sumatriptan pt decline\par EKG today NSR\par refer to neuro \par \par \par HLD: \par healthy diet and exercise; \par \par MRI for monitor FNH in prior MRI in 10/2020 STABLE \par \par anemia: \par iron supplement;\par labs \par advised to see gi and gyn \par \par Defer hepb shot #2;\par 1-2 month for hepb shot #2 \par \par lymphadenopathy:\par anterior cervical b/l; happened after the new meds;\par likely reactive\par advised to continue monitor\par follow up in 2 months;\par \par sister has breast cancer hx at young age \par check mammo \par \par SEEING  derm for acne \par OFF MINOCYCLINE NOW \par \par advised to see GI as referred AGAIN \par advised to see gyn \par advised to take iron supplement\par \par \par follow up in 01/2022 \par \par I spend a total of 15 minutes on the date of the encounter evaluating and treating patient\par

## 2021-12-09 ENCOUNTER — APPOINTMENT (OUTPATIENT)
Dept: NEUROLOGY | Facility: CLINIC | Age: 26
End: 2021-12-09
Payer: MEDICAID

## 2021-12-09 VITALS
TEMPERATURE: 98.2 F | HEART RATE: 94 BPM | DIASTOLIC BLOOD PRESSURE: 75 MMHG | WEIGHT: 156 LBS | SYSTOLIC BLOOD PRESSURE: 114 MMHG | BODY MASS INDEX: 26.31 KG/M2 | OXYGEN SATURATION: 98 % | HEIGHT: 64.5 IN

## 2021-12-09 PROCEDURE — 99205 OFFICE O/P NEW HI 60 MIN: CPT

## 2021-12-09 RX ORDER — AMITRIPTYLINE HYDROCHLORIDE 10 MG/1
10 TABLET, FILM COATED ORAL
Qty: 30 | Refills: 5 | Status: DISCONTINUED | COMMUNITY
Start: 2021-11-03 | End: 2021-12-09

## 2021-12-09 NOTE — ASSESSMENT
[FreeTextEntry1] : Patient has migraine with aura which persist on chronic daily basis.  She had unremarkable an MRI and MRV MR ray of the brain.  The patient was started on amitriptyline.  She takes it as needed.  We will plan to stop amitriptyline and start nortriptyline 10 mg at bedtime for headache prophylaxis.  Patient can take Tylenol as needed at onset of headache and repeat in 2 hours if the headache continues.  She should use Tylenol as rescue and limited to maximum of 3 days/week.  We will hold off riboflavin at this time.  Discussed other none medication options with the patient including acupuncture and Botox.\par \par I spent the time noted on the day of this patient encounter preparing for, providing and documenting the above E/M service and counseling and educate patient on differential, workup, disease course, and treatment/management. Education was provided to the patient during this encounter. All questions and concerns were answered and addressed in detail. The patient verbalized understanding and agreed to plan. Patient was advised to continue to monitor for neurologic symptoms and to notify my office or go to the nearest emergency room if there are any changes. Any orders/referrals and communications were provided as well. \par Side effects of the above medications were discussed in detail including but not limited to applicable black box warning and teratogenicity as appropriate. \par Patient was advised to bring previous records to my office, including CD of imaging, when applicable. \par \par

## 2021-12-09 NOTE — PHYSICAL EXAM
[General Appearance - Alert] : alert [General Appearance - In No Acute Distress] : in no acute distress [Person] : oriented to person [Place] : oriented to place [Time] : oriented to time [Registration Intact] : recent registration memory intact [Concentration Intact] : normal concentrating ability [Naming Objects] : no difficulty naming common objects [Fluency] : fluency intact [Comprehension] : comprehension intact [Vocabulary] : adequate range of vocabulary [Cranial Nerves Optic (II)] : visual acuity intact bilaterally,  visual fields full to confrontation, pupils equal round and reactive to light [Cranial Nerves Oculomotor (III)] : extraocular motion intact [Cranial Nerves Trigeminal (V)] : facial sensation intact symmetrically [Cranial Nerves Facial (VII)] : face symmetrical [Cranial Nerves Vestibulocochlear (VIII)] : hearing was intact bilaterally [Cranial Nerves Glossopharyngeal (IX)] : tongue and palate midline [Cranial Nerves Accessory (XI - Cranial And Spinal)] : head turning and shoulder shrug symmetric [Cranial Nerves Hypoglossal (XII)] : there was no tongue deviation with protrusion [Motor Tone] : muscle tone was normal in all four extremities [Motor Strength] : muscle strength was normal in all four extremities [Involuntary Movements] : no involuntary movements were seen [Sensation Tactile Decrease] : light touch was intact [Abnormal Walk] : normal gait [Balance] : balance was intact [Coordination - Dysmetria Impaired Finger-to-Nose Bilateral] : not present [Coordination - Dysmetria Impaired Heel-to-Shin Bilateral] : not present [1+] : Ankle jerk left 1+ [Plantar Reflex Right Only] : normal on the right [Plantar Reflex Left Only] : normal on the left

## 2021-12-09 NOTE — DATA REVIEWED
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  Tests are otherwise normal. [de-identified] : PMD note appreciated\par CBC noted\par EKG notable for QTC of 381

## 2021-12-09 NOTE — CONSULT LETTER
[Dear  ___] : Dear  [unfilled], [Consult Letter:] : I had the pleasure of evaluating your patient, [unfilled]. [Please see my note below.] : Please see my note below. [Consult Closing:] : Thank you very much for allowing me to participate in the care of this patient.  If you have any questions, please do not hesitate to contact me. [Sincerely,] : Sincerely, [FreeTextEntry3] : Jeanne Velazquez MD, MPH\par

## 2021-12-09 NOTE — HISTORY OF PRESENT ILLNESS
[FreeTextEntry1] : The patient is here for evaluation of headaches which started as a teenager.  The headache started in the neck and radiate to the bilateral frontotemporal regions and involved the vertex and then becomes holocephalic, feels like throbbing pain.  The pain is severe.  It occurs daily.  There is no photophobia or nausea vomiting.  The patient feels sonophobia.  There is no aura.  There is no one-sided weakness numbness or tingling with the headaches.  The headaches are worse as the day progresses.  The patient takes Tylenol without any significant relief of the pain.  She was given amitriptyline by her primary physician which she takes as needed at times, goes as well as not taking it for a week.  She was also given riboflavin 100 mg daily which she has not taken.\par \par The patient has an IUD, nonhormone eluding 1.\par There is family history of headaches, patient's sister has headaches.  Patient's mother has an aneurysm, unclear if first.

## 2021-12-20 ENCOUNTER — APPOINTMENT (OUTPATIENT)
Dept: GASTROENTEROLOGY | Facility: CLINIC | Age: 26
End: 2021-12-20
Payer: MEDICAID

## 2021-12-20 VITALS
OXYGEN SATURATION: 100 % | HEIGHT: 64.5 IN | HEART RATE: 83 BPM | DIASTOLIC BLOOD PRESSURE: 74 MMHG | SYSTOLIC BLOOD PRESSURE: 113 MMHG | BODY MASS INDEX: 25.64 KG/M2 | WEIGHT: 152 LBS

## 2021-12-20 PROCEDURE — 99204 OFFICE O/P NEW MOD 45 MIN: CPT

## 2021-12-20 NOTE — ASSESSMENT
[FreeTextEntry1] : 26F with pmhx of migraines, HLD presenting for evaluation of iron deficiency anemia, hematochezia. Scant hematochezia, suspect hemorrhoidal however in light of iron deficiency anemia, warrants endoscopic evaluation.\par 1. Hematochezia: \par - Colonoscopy as above.\par - COVID testing prior.\par - Prep ordered.\par 2. Iron deficiency anemia: \par - Colonoscopy as above.\par - EGD also to further evaluate, rule out upper sources.\par - Referral again given for Gynecology evaluation, instructed to f/u given hx of heavy menstrual periods w/ IUD as likely contributing to anemia and iron deficiency. \par RTC post EGC.

## 2021-12-20 NOTE — HISTORY OF PRESENT ILLNESS
[de-identified] : 26F with pmhx of migraines, HLD presenting for evaluation of anemia, hematochezia. Pt states has had chronic intermittent scant hematochezia described as blood seen on the toilet paper. Also notes hx of iron deficiency anemia, last Hb 10 w/ low ferritin. Has never had an EGD or colonoscopy. Denies any abd pain, n/v/d/c, melena, fever/chills, dysphagia, odynophagia, or other issues. Does note also heavy menstrual periods since her IUD placement. \par \par PMHx: Above.\par Medications: No AC or antiplts, rest see chart.\par Allergies: Minocycline.\par Surgical Hx: Hernia repair.\par SH: Occasional cig use. Social etoh. Denies drug use.\par FH: Sister with breast cancer. Uncle with possible colon cancer.

## 2022-01-07 ENCOUNTER — APPOINTMENT (OUTPATIENT)
Dept: INTERNAL MEDICINE | Facility: CLINIC | Age: 27
End: 2022-01-07
Payer: MEDICAID

## 2022-01-07 PROCEDURE — 99442: CPT

## 2022-01-07 NOTE — HEALTH RISK ASSESSMENT
[Yes] : Yes [Monthly or less (1 pt)] : Monthly or less (1 point) [1 or 2 (0 pts)] : 1 or 2 (0 points) [Never (0 pts)] : Never (0 points) [No] : In the past 12 months have you used drugs other than those required for medical reasons? No [0] : 2) Feeling down, depressed, or hopeless: Not at all (0) [Patient reported PAP Smear was normal] : Patient reported PAP Smear was normal [HIV Test offered] : HIV Test offered [Hepatitis C test offered] : Hepatitis C test offered [With Family] : lives with family [Employed] : employed [] :  [Sexually Active] : sexually active [Feels Safe at Home] : Feels safe at home [de-identified] : since age 15; 1/4 to 1 pack a day;  [[id number]] : 0 [Change in mental status noted] : No change in mental status noted [Language] : denies difficulty with language [Behavior] : denies difficulty with behavior [Learning/Retaining New Information] : denies difficulty learning/retaining new information [Handling Complex Tasks] : denies difficulty handling complex tasks [Reasoning] : denies difficulty with reasoning [Spatial Ability and Orientation] : denies difficulty with spatial ability and orientation [High Risk Behavior] : no high risk behavior [PapSmearDate] : 07/2021 [ColonoscopyComments] : never;  [FreeTextEntry2] [facility]  [de-identified] : nino

## 2022-01-07 NOTE — HISTORY OF PRESENT ILLNESS
[de-identified] : This visit was provided via TELEPHONE. The patient, CHEN GARZA , was located at home,[address]\par [address]\[place] [address] , at the time of the visit. \par The provider,ROBYN LOW , was located at his medical office located in  at the time of the visit. The patient, and Provider participated in the TELEPHONE\par Verbal consent given on Jan 7 2022 11:00AM by the patient.\par \par Verbal consent given on Dec  3 2021 10:30AM by the patient.\par \par \par \par \par 26 y.o F w/pmhX OF FNH, ventral hernia s/p repair, smoking, Hemorrhoid; HLD  today for follow up\par \par saw general surgery and had MRI done in 10/2020 showed FNH; current HAS IUD\par \par SAW General Surgery IN 03/2021; RECOVERY [facility]; WILL SEE SURGERY AGAIN BEFORE she start [employer]; \par \par Denies OF pregnancy OR BREAST FEEDING; \par \par HA : since 14 y.o;  intermittent; but daily; no waking up at night; it usually last about 30 mintis;  worsening during her period; b/l temporal; no trauma or injury; no change of quarterly; no photophobia or sonophobia; no fever or chills; no n/v;   Denies OF SEIZURE HX; no dizziness; the pain was very bad and cause she passing out; no focal deficient;  the pain is not thunder clap; but fell like throbbing ha; no vision change; FHx of migraine of sister the ah was 10/10 and pt passing out before due to the HA; mother have CVA possible hemorrhagic not sure whether has aneurysm of her mother; now mother is paralyzed; MRI/MRA/MRV brain ALL WNL; HA now every day; \par no pregnancy or breast feeding;no hX of seizure; still has HA 3 times a week; \par SAW NEURO AND ON INTRIPTYLINE FOR PPX  \par \par denies of any HA/CP/SOB/Palpitation\par ABLE TO CLIMB 4 FLIGHT OF Stairs \par \par taking tylenols and Ibuprohen had some temporary relieve; \par \par \par smoking since 15 y.o; about a pack a day; before; no quarter of pack a day; want to try to quit smoking; \par \par STILL REPORTED Intermittent GI BLEEDING WHEN SHE WIPE; BM ONCE EVERY 3 DAYS; NO EATING FIBER; STOOL IS HARD; NO RECTAL PAIN; NO DRINKING WATER; saw GI and planning for EGD ADN COLONOSCOPY \par \par was taking minocycline from derm and has itchiness; was in urgent care and on benedryal; off meds for 3 days; pt will see derm again in 11/102/201 for acne; \par \par \par denies of any anxiety \par \par also reported enlarged lymph node after taking minocycline\par \par NO Exercise; \par \par HAS NOT DO MAMMO OR SEE GYN \par

## 2022-01-07 NOTE — ASSESSMENT
[FreeTextEntry1] : had covid19 shot x2; moderna; last shot in 08/18/2021 \par \par \par decline flu shot \par \par refer to psychiatry to r/o ADHD\par \par still smoking;2 cigerate a day \par no able to tolerate with nicotine patch due to allergic dermatitis;\par sent gum\par \par  HA likely migraine now improved after been on PPX meds \par MRI / MRA/MRV BRAIN WNL;\par fail the supplement PPX meds \par continue with PPX meds which  PT AGREES  now and sumatriptan pt decline\par EKG today NSR\par advised to see neuro routinely \par \par \par HLD: \par healthy diet and exercise; \par labs \par \par MRI for monitor FNH in prior MRI in 10/2020 STABLE \par \par anemia: \par iron supplement;\par labs \par advised to see gi for colonoscopy and egd and gyn \par \par Defer hepb shot #2;\par 1-2 month for hepb shot #2 \par \par lymphadenopathy:\par anterior cervical b/l; happened after the new meds;\par likely reactive\par advised to continue monitor\par follow up in 1 months;\par \par sister has breast cancer hx at young age \par check mammo;REFER AGAIN \par \par SEEING  derm for acne \par OFF MINOCYCLINE NOW \par \par \par advised to see gynAGAIN\par advised to take iron supplement\par \par \par follow up in 02/2022 \par \par I spend a total of 15 minutes on the date of the encounter evaluating and treating patient\par

## 2022-01-11 LAB — SARS-COV-2 N GENE NPH QL NAA+PROBE: NOT DETECTED

## 2022-01-13 ENCOUNTER — OUTPATIENT (OUTPATIENT)
Dept: OUTPATIENT SERVICES | Facility: HOSPITAL | Age: 27
LOS: 1 days | End: 2022-01-13
Payer: MEDICAID

## 2022-01-13 ENCOUNTER — APPOINTMENT (OUTPATIENT)
Dept: GASTROENTEROLOGY | Facility: HOSPITAL | Age: 27
End: 2022-01-13

## 2022-01-13 DIAGNOSIS — Z01.818 ENCOUNTER FOR OTHER PREPROCEDURAL EXAMINATION: ICD-10-CM

## 2022-01-13 DIAGNOSIS — K62.5 HEMORRHAGE OF ANUS AND RECTUM: ICD-10-CM

## 2022-01-13 DIAGNOSIS — D50.9 IRON DEFICIENCY ANEMIA, UNSPECIFIED: ICD-10-CM

## 2022-01-13 LAB — HCG UR QL: NEGATIVE — SIGNIFICANT CHANGE UP

## 2022-01-13 PROCEDURE — 81025 URINE PREGNANCY TEST: CPT

## 2022-01-13 PROCEDURE — 45378 DIAGNOSTIC COLONOSCOPY: CPT

## 2022-01-20 ENCOUNTER — APPOINTMENT (OUTPATIENT)
Dept: NEUROLOGY | Facility: CLINIC | Age: 27
End: 2022-01-20

## 2022-02-07 ENCOUNTER — LABORATORY RESULT (OUTPATIENT)
Age: 27
End: 2022-02-07

## 2022-02-07 ENCOUNTER — APPOINTMENT (OUTPATIENT)
Dept: GASTROENTEROLOGY | Facility: CLINIC | Age: 27
End: 2022-02-07
Payer: MEDICAID

## 2022-02-07 ENCOUNTER — APPOINTMENT (OUTPATIENT)
Dept: INTERNAL MEDICINE | Facility: CLINIC | Age: 27
End: 2022-02-07
Payer: MEDICAID

## 2022-02-07 VITALS
TEMPERATURE: 98 F | DIASTOLIC BLOOD PRESSURE: 75 MMHG | BODY MASS INDEX: 26.46 KG/M2 | SYSTOLIC BLOOD PRESSURE: 122 MMHG | HEIGHT: 64 IN | OXYGEN SATURATION: 99 % | WEIGHT: 155 LBS | HEART RATE: 90 BPM

## 2022-02-07 VITALS
DIASTOLIC BLOOD PRESSURE: 66 MMHG | HEART RATE: 79 BPM | RESPIRATION RATE: 16 BRPM | TEMPERATURE: 98.6 F | SYSTOLIC BLOOD PRESSURE: 109 MMHG | WEIGHT: 153 LBS | OXYGEN SATURATION: 100 % | BODY MASS INDEX: 26.12 KG/M2 | HEIGHT: 64 IN

## 2022-02-07 PROCEDURE — 99213 OFFICE O/P EST LOW 20 MIN: CPT

## 2022-02-07 PROCEDURE — 99214 OFFICE O/P EST MOD 30 MIN: CPT

## 2022-02-07 NOTE — HISTORY OF PRESENT ILLNESS
[de-identified] : 26F with pmhx of migraines, HLD, iron def anemia presenting for follow-up. Had colonoscopy showing internal and external hemorrhoids. Scheduled for EGD 3/3. States feels well today. Notes intermittent constipation/diarrhea- notes BMs every 3 days and when goes typically loose, strains. No longer having hematochezia. Denies any other complaints, no abd pain, n/v, fever/chills, dysphagia, or other issues.

## 2022-02-07 NOTE — HISTORY OF PRESENT ILLNESS
[de-identified] : 26 y.o F w/pmhX OF FNH, ventral hernia s/p repair, smoking, Hemorrhoid; HLD  today for follow up\par \par saw general surgery and had MRI done in 10/2020 showed FNH; current HAS IUD\par \par SAW General Surgery IN 03/2021; RECOVERY [facility]; WILL SEE SURGERY AGAIN BEFORE she start [employer]; \par \par Denies OF pregnancy OR BREAST FEEDING; \par \par HA : since 14 y.o;  intermittent; but daily; no waking up at night; it usually last about 30 mintis;  worsening during her period; b/l temporal; no trauma or injury; no change of quarterly; no photophobia or sonophobia; no fever or chills; no n/v;   Denies OF SEIZURE HX; no dizziness; the pain was very bad and cause she passing out; no focal deficient;  the pain is not thunder clap; but fell like throbbing ha; no vision change; FHx of migraine of sister the ah was 10/10 and pt passing out before due to the HA; mother have CVA possible hemorrhagic not sure whether has aneurysm of her mother; now mother is paralyzed; MRI/MRA/MRV brain ALL WNL; HA now every day; \par no pregnancy or breast feeding;no hX of seizure; still has HA 3 times a week; \par SAW NEURO AND ON INTRIPTYLINE FOR PPX  \par \par denies of any HA/CP/SOB/Palpitation\par ABLE TO CLIMB 4 FLIGHT OF Stairs \par \par taking tylenols and Ibuprohen had some temporary relieve; \par \par \par smoking since 15 y.o; about a pack a day; before; no quarter of pack a day; want to try to quit smoking; \par \par STILL REPORTED Intermittent GI BLEEDING WHEN SHE WIPE; BM ONCE EVERY 3 DAYS; NO EATING FIBER; STOOL IS HARD; NO RECTAL PAIN; NO DRINKING WATER; saw GI and planning for EGD ; had COLONOSCOPY \par \par was taking minocycline from derm and has itchiness; was in urgent care and on benedryal; off meds for 3 days; pt will see derm again in 11/102/201 for acne; \par \par \par denies of any anxiety \par \par also reported enlarged lymph node after taking minocycline\par \par NO Exercise; \par \par HAS NOT DO MAMMO OR SEE GYN \par \par \par ROS\par \par Constitutional:  no fever and no chills. \par Eyes:  no discharge and no pain. \par HEENT:  no earache and no hearing loss. \par Cardiovascular:  no chest pain, no palpitations and no leg claudication. \par Respiratory:  no shortness of breath, no wheezing and no cough. \par Gastrointestinal:  no abdominal pain, no nausea and no constipation. \par Genitourinary:  no dysuria and no incontinence. \par Musculoskeletal:  no joint pain, no joint stiffness and no joint swelling. \par Integumentary:  no itching and no mole changes. \par Neurological:  no headache, no dizziness and no fainting. \par Psychiatric:  not suicidal, no insomnia, no anxiety and no depression. \par \par Physical Exam\par \par Constitutional:   no acute distress, well nourished, well developed and well-appearing. \par Eyes:  normal sclera/conjunctiva, pupils equal round and reactive to light and extraocular movements intact. \par ENT:  the outer ears and nose were normal in appearance and the oropharynx was normal. \par Neck:  supple, no lymphadenopathy and the thyroid was normal and there were no nodules present. \par Pulmonary:  no respiratory distress, lungs were clear to auscultation bilaterally, no accessory muscle use. \par Cardiac:  normal rate, with a regular rhythm, normal S1 and S2 and no murmur heard. \par Vascular:  there was no peripheral edema. \par Abdomen:  abdomen soft, non-tender, non-distended, no abdominal mass palpated, no HSM and normal bowel sounds. \par Lymphatic:  no posterior cervical lymphadenopathy, no anterior cervical lymphadenopathy. \par Back:  no CVA tenderness and no spinal tenderness. \par Musculoskeletal: no joint swelling and grossly normal strength/tone. \par Skin:  no rash. \par Neurology:  normal gait, coordination grossly intact, no focal deficits and deep tendon reflexes were 2+ and symmetric. \par Psychiatric:  the affect was normal, oriented to person, place, and time and insight and judgment were intact.\par

## 2022-02-07 NOTE — ASSESSMENT
[FreeTextEntry1] : 26F with pmhx of migraines, HLD, iron def anemia presenting for follow-up. Had colonoscopy for LORE and hematochezia since last visit showing internal and external hemorrhoids. Notes intermittent constipation/diarrhea- notes BMs every 3 days and when goes typically loose, strains. Hematochezia resolved.\par 1. Constipation/intermittent diarrhea: Likely overflow loose stools from chronic constipation given straining. \par - Instructed to take Citrucel fiber supplements, 1-2 tabs daily.\par 2. Hematochezia: 2/2 hemorrhoids. Now resolved. \par - Fiber supplementation as above.\par 3. Iron def anemia: Colonoscopy unremarkable, scheduled for EGD in March however pt wishes to schedule sooner.\par - EGD to be rescheduled for this month.\par - Suspect anemia likely due to heavy menstrual periods, instructed again to f/u with Gynecology for options.

## 2022-02-07 NOTE — HEALTH RISK ASSESSMENT
[Yes] : Yes [Monthly or less (1 pt)] : Monthly or less (1 point) [1 or 2 (0 pts)] : 1 or 2 (0 points) [Never (0 pts)] : Never (0 points) [No] : In the past 12 months have you used drugs other than those required for medical reasons? No [0] : 2) Feeling down, depressed, or hopeless: Not at all (0) [Patient reported PAP Smear was normal] : Patient reported PAP Smear was normal [HIV Test offered] : HIV Test offered [Hepatitis C test offered] : Hepatitis C test offered [With Family] : lives with family [Employed] : employed [] :  [Sexually Active] : sexually active [Feels Safe at Home] : Feels safe at home

## 2022-02-07 NOTE — HEALTH RISK ASSESSMENT
[de-identified] : since age 15; 1/4 to 1 pack a day;  [[id number]] : 0 [Change in mental status noted] : No change in mental status noted [Language] : denies difficulty with language [Behavior] : denies difficulty with behavior [Learning/Retaining New Information] : denies difficulty learning/retaining new information [Handling Complex Tasks] : denies difficulty handling complex tasks [Reasoning] : denies difficulty with reasoning [Spatial Ability and Orientation] : denies difficulty with spatial ability and orientation [High Risk Behavior] : no high risk behavior [PapSmearDate] : 07/2021 [ColonoscopyComments] : never;  [FreeTextEntry2] [facility]  [de-identified] : nino

## 2022-02-07 NOTE — ASSESSMENT
[FreeTextEntry1] : had covid19 shot x2; moderna; last shot in 08/18/2021 \par \par \par decline flu shot \par \par refer to psychiatry to r/o ADHD\par \par still smoking;2 cigerate a day \par no able to tolerate with nicotine patch due to allergic dermatitis;\par sent gum\par \par  HA likely migraine now improved after been on PPX meds \par MRI / MRA/MRV BRAIN WNL;\par fail the supplement PPX meds \par continue with PPX meds which  PT AGREES  now and sumatriptan pt decline\par EKG today NSR\par advised to see neuro routinely \par \par \par HLD: \par healthy diet and exercise; \par labs \par \par MRI for monitor 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

## 2022-02-09 ENCOUNTER — NON-APPOINTMENT (OUTPATIENT)
Age: 27
End: 2022-02-09

## 2022-02-09 LAB
ALBUMIN SERPL ELPH-MCNC: 4.4 G/DL
ALP BLD-CCNC: 62 U/L
ALT SERPL-CCNC: 11 U/L
ANION GAP SERPL CALC-SCNC: 16 MMOL/L
AST SERPL-CCNC: 12 U/L
BASOPHILS # BLD AUTO: 0.03 K/UL
BASOPHILS NFR BLD AUTO: 0.5 %
BILIRUB SERPL-MCNC: 0.2 MG/DL
BUN SERPL-MCNC: 6 MG/DL
CALCIUM SERPL-MCNC: 9.3 MG/DL
CHLORIDE SERPL-SCNC: 105 MMOL/L
CHOLEST SERPL-MCNC: 198 MG/DL
CO2 SERPL-SCNC: 20 MMOL/L
COVID-19 SPIKE DOMAIN ANTIBODY INTERPRETATION: POSITIVE
CREAT SERPL-MCNC: 0.6 MG/DL
EOSINOPHIL # BLD AUTO: 0.09 K/UL
EOSINOPHIL NFR BLD AUTO: 1.6 %
FERRITIN SERPL-MCNC: 7 NG/ML
GLUCOSE SERPL-MCNC: 84 MG/DL
HCT VFR BLD CALC: 37.8 %
HDLC SERPL-MCNC: 41 MG/DL
HGB BLD-MCNC: 10.5 G/DL
IMM GRANULOCYTES NFR BLD AUTO: 0.2 %
IRON SATN MFR SERPL: 7 %
IRON SERPL-MCNC: 27 UG/DL
LDLC SERPL CALC-MCNC: 134 MG/DL
LYMPHOCYTES # BLD AUTO: 1.98 K/UL
LYMPHOCYTES NFR BLD AUTO: 34.2 %
MAN DIFF?: NORMAL
MCHC RBC-ENTMCNC: 24.6 PG
MCHC RBC-ENTMCNC: 27.8 GM/DL
MCV RBC AUTO: 88.7 FL
MONOCYTES # BLD AUTO: 0.63 K/UL
MONOCYTES NFR BLD AUTO: 10.9 %
NEUTROPHILS # BLD AUTO: 3.05 K/UL
NEUTROPHILS NFR BLD AUTO: 52.6 %
NONHDLC SERPL-MCNC: 157 MG/DL
PLATELET # BLD AUTO: 326 K/UL
POTASSIUM SERPL-SCNC: 4.5 MMOL/L
PROT SERPL-MCNC: 7.1 G/DL
RBC # BLD: 4.26 M/UL
RBC # FLD: 14.8 %
SARS-COV-2 AB SERPL IA-ACNC: >250 U/ML
SODIUM SERPL-SCNC: 140 MMOL/L
TIBC SERPL-MCNC: 400 UG/DL
TRANSFERRIN SERPL-MCNC: 338 MG/DL
TRIGL SERPL-MCNC: 119 MG/DL
UIBC SERPL-MCNC: 373 UG/DL
WBC # FLD AUTO: 5.79 K/UL

## 2022-02-16 ENCOUNTER — APPOINTMENT (OUTPATIENT)
Dept: INTERNAL MEDICINE | Facility: CLINIC | Age: 27
End: 2022-02-16
Payer: MEDICAID

## 2022-02-16 PROCEDURE — 90746 HEPB VACCINE 3 DOSE ADULT IM: CPT

## 2022-02-16 PROCEDURE — 90715 TDAP VACCINE 7 YRS/> IM: CPT

## 2022-02-16 PROCEDURE — 90472 IMMUNIZATION ADMIN EACH ADD: CPT

## 2022-02-16 PROCEDURE — 90471 IMMUNIZATION ADMIN: CPT

## 2022-02-22 LAB — SARS-COV-2 N GENE NPH QL NAA+PROBE: NOT DETECTED

## 2022-02-24 ENCOUNTER — APPOINTMENT (OUTPATIENT)
Dept: GASTROENTEROLOGY | Facility: HOSPITAL | Age: 27
End: 2022-02-24

## 2022-03-07 ENCOUNTER — APPOINTMENT (OUTPATIENT)
Dept: OBGYN | Facility: CLINIC | Age: 27
End: 2022-03-07
Payer: MEDICAID

## 2022-03-07 VITALS
HEART RATE: 101 BPM | WEIGHT: 156 LBS | DIASTOLIC BLOOD PRESSURE: 66 MMHG | TEMPERATURE: 98.3 F | BODY MASS INDEX: 26.63 KG/M2 | SYSTOLIC BLOOD PRESSURE: 112 MMHG | HEIGHT: 64 IN | OXYGEN SATURATION: 98 %

## 2022-03-07 PROCEDURE — 99214 OFFICE O/P EST MOD 30 MIN: CPT | Mod: 25

## 2022-03-07 PROCEDURE — 58301 REMOVE INTRAUTERINE DEVICE: CPT

## 2022-03-07 NOTE — DISCUSSION/SUMMARY
[FreeTextEntry1] : [] IUD removed \par [] contraception counseling provided. pt would like to try progesterone IUD, will start on  in meantime. pt has taken  previously. has migraines but without aura. no htn or cigarette smoking. understands increased VTE risk. rx sent and instructions reviewed in detail. pt understands importance of taking daily at same time.\par RTC for progesterone IUD insertion\par Xochilt MELVIN

## 2022-03-07 NOTE — HISTORY OF PRESENT ILLNESS
[FreeTextEntry1] : 25yo P1 LMP 3/6/22 here for IUD removal. \par reports paragard IUD was placed 2019, since then interval of her menses have become progressively shorter  and amount heavier. now having menses every 2 wks. \par noted to be anemic hence started getting eval w/ unremarkable GI w/up recently. 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 \par \par lives with family. 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

## 2022-03-07 NOTE — PROCEDURE
[IUD Removal] : intrauterine device (IUD) removal [Time out performed] : Pre-procedure time out performed.  Patient's name, date of birth and procedure confirmed. [Consent Obtained] : Consent obtained [Menorrhagia] : menorrhagia [Risks] : risks [Benefits] : benefits [Alternatives] : alternatives [Patient] : patient [Speculum Placed] : speculum placed [IUD Removed - Forceps] : IUD removed - forceps [IUD Discarded] : IUD discarded [Tolerated Well] : Patient tolerated the procedure well [No Complications] : no complications [Heavy Vaginal Bleeding] : for heavy vaginal bleeding [Pelvic Pain] : for pelvic pain

## 2022-03-09 LAB — SARS-COV-2 N GENE NPH QL NAA+PROBE: NOT DETECTED

## 2022-03-10 ENCOUNTER — APPOINTMENT (OUTPATIENT)
Dept: GASTROENTEROLOGY | Facility: HOSPITAL | Age: 27
End: 2022-03-10

## 2022-03-10 ENCOUNTER — OUTPATIENT (OUTPATIENT)
Dept: OUTPATIENT SERVICES | Facility: HOSPITAL | Age: 27
LOS: 1 days | End: 2022-03-10
Payer: MEDICAID

## 2022-03-10 ENCOUNTER — RESULT REVIEW (OUTPATIENT)
Age: 27
End: 2022-03-10

## 2022-03-10 DIAGNOSIS — D50.9 IRON DEFICIENCY ANEMIA, UNSPECIFIED: ICD-10-CM

## 2022-03-10 LAB — HCG UR QL: NEGATIVE — SIGNIFICANT CHANGE UP

## 2022-03-10 PROCEDURE — 81025 URINE PREGNANCY TEST: CPT

## 2022-03-10 PROCEDURE — 88305 TISSUE EXAM BY PATHOLOGIST: CPT

## 2022-03-10 PROCEDURE — 43239 EGD BIOPSY SINGLE/MULTIPLE: CPT

## 2022-03-10 PROCEDURE — 88305 TISSUE EXAM BY PATHOLOGIST: CPT | Mod: 26

## 2022-03-10 PROCEDURE — 88312 SPECIAL STAINS GROUP 1: CPT

## 2022-03-10 PROCEDURE — 88312 SPECIAL STAINS GROUP 1: CPT | Mod: 26

## 2022-03-10 DEVICE — CATH BALLOON DIL 6-8MM: Type: IMPLANTABLE DEVICE | Status: FUNCTIONAL

## 2022-03-10 DEVICE — CATH ESOPH DIL 9 ATM 6FR 8-10MM: Type: IMPLANTABLE DEVICE | Status: FUNCTIONAL

## 2022-03-10 DEVICE — CATH ESOPH DIL 8 ATM 6FR10-12M: Type: IMPLANTABLE DEVICE | Status: FUNCTIONAL

## 2022-03-14 LAB — SURGICAL PATHOLOGY STUDY: SIGNIFICANT CHANGE UP

## 2022-03-22 ENCOUNTER — APPOINTMENT (OUTPATIENT)
Dept: NEUROLOGY | Facility: CLINIC | Age: 27
End: 2022-03-22

## 2022-04-08 ENCOUNTER — APPOINTMENT (OUTPATIENT)
Dept: INTERNAL MEDICINE | Facility: CLINIC | Age: 27
End: 2022-04-08
Payer: MEDICAID

## 2022-04-08 PROCEDURE — 99441: CPT

## 2022-04-08 NOTE — HISTORY OF PRESENT ILLNESS
[de-identified] : This visit was provided via TELEPHONE. The patient, CHEN GARZA , was located at home,[address] [place] [address]\par [address]\[place] [address] , at the time of the visit. \par The provider,ROBYN LOW , was located at his medical office located in  at the time of the visit. The patient, and Provider participated in the TELEPHONE\par Verbal consent given on Apr 8 2022  3:30PM by the patient.\par \par \par \par [facility] G[facility]; PLANNING REPLACE iud\par \par [facility] GI AND EGD AND COLONOSCOPY DID NOT SHOW OBVIOUSLY REASON FOR BLEEDING;\par \par HAS NOT SEE HEMATOLOGIST\par \par TAKE IRON SUPPLEMENT NOW \par \par \par A/p\par : \par CHECK LABS\par CONTINUE With IRON SUPPLEMENT \par \par REFER TO HEMATOLOGY\par \par 1 MONTH FOLLOW UP ON ttm \par \par I spend a total of 10 minutes on the date of the encounter evaluating and treating patient\par \par \par

## 2022-05-18 ENCOUNTER — APPOINTMENT (OUTPATIENT)
Dept: INTERNAL MEDICINE | Facility: CLINIC | Age: 27
End: 2022-05-18

## 2022-07-07 ENCOUNTER — NON-APPOINTMENT (OUTPATIENT)
Age: 27
End: 2022-07-07

## 2022-07-19 ENCOUNTER — NON-APPOINTMENT (OUTPATIENT)
Age: 27
End: 2022-07-19

## 2022-11-16 ENCOUNTER — EMERGENCY (EMERGENCY)
Facility: HOSPITAL | Age: 27
LOS: 1 days | Discharge: HOME | End: 2022-11-16
Attending: EMERGENCY MEDICINE
Payer: MEDICAID

## 2022-11-16 VITALS
HEART RATE: 118 BPM | DIASTOLIC BLOOD PRESSURE: 81 MMHG | HEIGHT: 64 IN | WEIGHT: 160.06 LBS | TEMPERATURE: 103 F | OXYGEN SATURATION: 99 % | RESPIRATION RATE: 20 BRPM | SYSTOLIC BLOOD PRESSURE: 122 MMHG

## 2022-11-16 VITALS
RESPIRATION RATE: 18 BRPM | HEART RATE: 79 BPM | OXYGEN SATURATION: 98 % | SYSTOLIC BLOOD PRESSURE: 98 MMHG | TEMPERATURE: 100 F | DIASTOLIC BLOOD PRESSURE: 55 MMHG

## 2022-11-16 LAB
ALBUMIN SERPL ELPH-MCNC: 3.5 G/DL — SIGNIFICANT CHANGE UP (ref 3.5–5)
ALP SERPL-CCNC: 53 U/L — SIGNIFICANT CHANGE UP (ref 40–120)
ALT FLD-CCNC: 23 U/L DA — SIGNIFICANT CHANGE UP (ref 10–60)
ANION GAP SERPL CALC-SCNC: 7 MMOL/L — SIGNIFICANT CHANGE UP (ref 5–17)
APPEARANCE UR: CLEAR — SIGNIFICANT CHANGE UP
APTT BLD: 39.9 SEC — HIGH (ref 27.5–35.5)
AST SERPL-CCNC: 13 U/L — SIGNIFICANT CHANGE UP (ref 10–40)
BASOPHILS # BLD AUTO: 0.03 K/UL — SIGNIFICANT CHANGE UP (ref 0–0.2)
BASOPHILS NFR BLD AUTO: 0.5 % — SIGNIFICANT CHANGE UP (ref 0–2)
BILIRUB SERPL-MCNC: 0.3 MG/DL — SIGNIFICANT CHANGE UP (ref 0.2–1.2)
BILIRUB UR-MCNC: NEGATIVE — SIGNIFICANT CHANGE UP
BUN SERPL-MCNC: 4 MG/DL — LOW (ref 7–18)
CALCIUM SERPL-MCNC: 8.9 MG/DL — SIGNIFICANT CHANGE UP (ref 8.4–10.5)
CHLORIDE SERPL-SCNC: 105 MMOL/L — SIGNIFICANT CHANGE UP (ref 96–108)
CO2 SERPL-SCNC: 24 MMOL/L — SIGNIFICANT CHANGE UP (ref 22–31)
COLOR SPEC: YELLOW — SIGNIFICANT CHANGE UP
CREAT SERPL-MCNC: 0.72 MG/DL — SIGNIFICANT CHANGE UP (ref 0.5–1.3)
DIFF PNL FLD: NEGATIVE — SIGNIFICANT CHANGE UP
EGFR: 116 ML/MIN/1.73M2 — SIGNIFICANT CHANGE UP
EOSINOPHIL # BLD AUTO: 0.01 K/UL — SIGNIFICANT CHANGE UP (ref 0–0.5)
EOSINOPHIL NFR BLD AUTO: 0.2 % — SIGNIFICANT CHANGE UP (ref 0–6)
FLUAV H1 2009 PAND RNA SPEC QL NAA+PROBE: DETECTED
GLUCOSE SERPL-MCNC: 106 MG/DL — HIGH (ref 70–99)
GLUCOSE UR QL: NEGATIVE — SIGNIFICANT CHANGE UP
HCT VFR BLD CALC: 42.7 % — SIGNIFICANT CHANGE UP (ref 34.5–45)
HGB BLD-MCNC: 13.3 G/DL — SIGNIFICANT CHANGE UP (ref 11.5–15.5)
IMM GRANULOCYTES NFR BLD AUTO: 0.2 % — SIGNIFICANT CHANGE UP (ref 0–0.9)
INR BLD: 1.33 RATIO — HIGH (ref 0.88–1.16)
KETONES UR-MCNC: NEGATIVE — SIGNIFICANT CHANGE UP
LACTATE SERPL-SCNC: 1.4 MMOL/L — SIGNIFICANT CHANGE UP (ref 0.7–2)
LEUKOCYTE ESTERASE UR-ACNC: NEGATIVE — SIGNIFICANT CHANGE UP
LYMPHOCYTES # BLD AUTO: 0.75 K/UL — LOW (ref 1–3.3)
LYMPHOCYTES # BLD AUTO: 12.4 % — LOW (ref 13–44)
MCHC RBC-ENTMCNC: 27.9 PG — SIGNIFICANT CHANGE UP (ref 27–34)
MCHC RBC-ENTMCNC: 31.1 GM/DL — LOW (ref 32–36)
MCV RBC AUTO: 89.7 FL — SIGNIFICANT CHANGE UP (ref 80–100)
MONOCYTES # BLD AUTO: 0.54 K/UL — SIGNIFICANT CHANGE UP (ref 0–0.9)
MONOCYTES NFR BLD AUTO: 9 % — SIGNIFICANT CHANGE UP (ref 2–14)
NEUTROPHILS # BLD AUTO: 4.69 K/UL — SIGNIFICANT CHANGE UP (ref 1.8–7.4)
NEUTROPHILS NFR BLD AUTO: 77.7 % — HIGH (ref 43–77)
NITRITE UR-MCNC: NEGATIVE — SIGNIFICANT CHANGE UP
NRBC # BLD: 0 /100 WBCS — SIGNIFICANT CHANGE UP (ref 0–0)
PH UR: 7 — SIGNIFICANT CHANGE UP (ref 5–8)
PLATELET # BLD AUTO: 234 K/UL — SIGNIFICANT CHANGE UP (ref 150–400)
POTASSIUM SERPL-MCNC: 3.6 MMOL/L — SIGNIFICANT CHANGE UP (ref 3.5–5.3)
POTASSIUM SERPL-SCNC: 3.6 MMOL/L — SIGNIFICANT CHANGE UP (ref 3.5–5.3)
PROT SERPL-MCNC: 7.8 G/DL — SIGNIFICANT CHANGE UP (ref 6–8.3)
PROT UR-MCNC: 15
PROTHROM AB SERPL-ACNC: 15.9 SEC — HIGH (ref 10.5–13.4)
RAPID RVP RESULT: DETECTED
RBC # BLD: 4.76 M/UL — SIGNIFICANT CHANGE UP (ref 3.8–5.2)
RBC # FLD: 12.7 % — SIGNIFICANT CHANGE UP (ref 10.3–14.5)
SARS-COV-2 RNA SPEC QL NAA+PROBE: SIGNIFICANT CHANGE UP
SODIUM SERPL-SCNC: 136 MMOL/L — SIGNIFICANT CHANGE UP (ref 135–145)
SP GR SPEC: 1 — LOW (ref 1.01–1.02)
UROBILINOGEN FLD QL: NEGATIVE — SIGNIFICANT CHANGE UP
WBC # BLD: 6.03 K/UL — SIGNIFICANT CHANGE UP (ref 3.8–10.5)
WBC # FLD AUTO: 6.03 K/UL — SIGNIFICANT CHANGE UP (ref 3.8–10.5)

## 2022-11-16 PROCEDURE — 87040 BLOOD CULTURE FOR BACTERIA: CPT

## 2022-11-16 PROCEDURE — 0225U NFCT DS DNA&RNA 21 SARSCOV2: CPT

## 2022-11-16 PROCEDURE — 85025 COMPLETE CBC W/AUTO DIFF WBC: CPT

## 2022-11-16 PROCEDURE — 71045 X-RAY EXAM CHEST 1 VIEW: CPT | Mod: 26

## 2022-11-16 PROCEDURE — 99285 EMERGENCY DEPT VISIT HI MDM: CPT | Mod: 25

## 2022-11-16 PROCEDURE — 85610 PROTHROMBIN TIME: CPT

## 2022-11-16 PROCEDURE — 36415 COLL VENOUS BLD VENIPUNCTURE: CPT

## 2022-11-16 PROCEDURE — 80053 COMPREHEN METABOLIC PANEL: CPT

## 2022-11-16 PROCEDURE — 96374 THER/PROPH/DIAG INJ IV PUSH: CPT

## 2022-11-16 PROCEDURE — 81001 URINALYSIS AUTO W/SCOPE: CPT

## 2022-11-16 PROCEDURE — 99285 EMERGENCY DEPT VISIT HI MDM: CPT

## 2022-11-16 PROCEDURE — 93005 ELECTROCARDIOGRAM TRACING: CPT

## 2022-11-16 PROCEDURE — 83605 ASSAY OF LACTIC ACID: CPT

## 2022-11-16 PROCEDURE — 85730 THROMBOPLASTIN TIME PARTIAL: CPT

## 2022-11-16 PROCEDURE — 71045 X-RAY EXAM CHEST 1 VIEW: CPT

## 2022-11-16 PROCEDURE — 96375 TX/PRO/DX INJ NEW DRUG ADDON: CPT

## 2022-11-16 RX ORDER — SODIUM CHLORIDE 9 MG/ML
2000 INJECTION INTRAMUSCULAR; INTRAVENOUS; SUBCUTANEOUS ONCE
Refills: 0 | Status: COMPLETED | OUTPATIENT
Start: 2022-11-16 | End: 2022-11-16

## 2022-11-16 RX ORDER — METOCLOPRAMIDE HCL 10 MG
10 TABLET ORAL ONCE
Refills: 0 | Status: COMPLETED | OUTPATIENT
Start: 2022-11-16 | End: 2022-11-16

## 2022-11-16 RX ORDER — CEFTRIAXONE 500 MG/1
1000 INJECTION, POWDER, FOR SOLUTION INTRAMUSCULAR; INTRAVENOUS ONCE
Refills: 0 | Status: COMPLETED | OUTPATIENT
Start: 2022-11-16 | End: 2022-11-16

## 2022-11-16 RX ORDER — KETOROLAC TROMETHAMINE 30 MG/ML
15 SYRINGE (ML) INJECTION ONCE
Refills: 0 | Status: DISCONTINUED | OUTPATIENT
Start: 2022-11-16 | End: 2022-11-16

## 2022-11-16 RX ORDER — ACETAMINOPHEN 500 MG
1000 TABLET ORAL ONCE
Refills: 0 | Status: COMPLETED | OUTPATIENT
Start: 2022-11-16 | End: 2022-11-16

## 2022-11-16 RX ADMIN — Medication 10 MILLIGRAM(S): at 16:52

## 2022-11-16 RX ADMIN — CEFTRIAXONE 100 MILLIGRAM(S): 500 INJECTION, POWDER, FOR SOLUTION INTRAMUSCULAR; INTRAVENOUS at 16:52

## 2022-11-16 RX ADMIN — Medication 15 MILLIGRAM(S): at 16:52

## 2022-11-16 RX ADMIN — SODIUM CHLORIDE 2000 MILLILITER(S): 9 INJECTION INTRAMUSCULAR; INTRAVENOUS; SUBCUTANEOUS at 16:51

## 2022-11-16 RX ADMIN — Medication 400 MILLIGRAM(S): at 16:52

## 2022-11-16 NOTE — ED ADULT NURSE NOTE - OBJECTIVE STATEMENT
Pt AOx4, ambulatory, c/o severe back pain, fever and increased weakness x 2 days. Pt denies fall/trauma, CP, SOB. PT reports negative at home Covid test.

## 2022-11-16 NOTE — ED PROVIDER NOTE - DISPOSITION TYPE
Progress Notes by Cheryle Bello MD at 09/08/17 01:01 PM     Author:  Cheryle Bello MD Service:  (none) Author Type:  Physician     Filed:  09/08/17 02:40 PM Encounter Date:  9/8/2017 Status:  Signed     :  Cheryle Bello MD (Physician)            PEDIATRIC ILLNESS VISIT       9/8/2017    Roomed by: Owen Santos MA 1:01 PM      SUBJECTIVE  Accompanied by:[ER1.1T]  Mother and Siblings[ER1.1M]  Philipp is a 6 year old male who is complaining of[ER1.1T] a follow up on a rash, no fevers, no open sores.[ER1.1M].  Present for[ER1.1T] 2-3 weeks[ER1.1M] and is[ER1.1T] stable[ER1.1M].  Present treatments include -[ER1.1T] none[ER1.1M].   Previous medical contacts for the problem -[ER1.1T] none[ER1.1M]  - seen by Gabrielle Valdovinos NP a few weeks ago and prescribed clotriamazole.  - child does participate in wrestling.[BS1.1M]    Symptoms:  Fever:[ER1.1T]     No elevation of temperature[ER1.1M]  General:[ER1.1T] No problems, irritability, fussiness, crying, or lethargy and No irritability or lethargy noted[ER1.1M]  Skin:    RASH - not improving[BS1.1M]    ROS  All other ROS negative unless indicated otherwise above.    Allergies:  Review of patient's allergies indicates no known allergies.    Current Outpatient Prescriptions     Medication  Sig   • Clotrimazole 1 % OINT Apply to affected area twice a day for 1-2 weeks     Family history:[ER1.1T] No other family members have acute illnesses[BS1.1M]    Social history:[ER1.1T] Attends school[BS1.1M]    OBJECTIVE:   Physical exam[ER1.1T]  Pulse 89, temperature 98.1 °F (36.7 °C), temperature source Temporal, resp. rate 21, weight 59 lb 4 oz (26.9 kg), SpO2 100 %.[ER1.2T]    GENERAL:[ER1.1T] Normal- alert and no distress noted   HEAD & SCALP: No lesions, swelling, tenderness or abnormalities.  EYES: No redness, swelling, drainage or abnormalities.  EARS: No abnormalities of external ears, canals or tm's.  NOSE: No swelling or  drainage..  MOUTH: No abnormalities of tongue or mucosal membranes.  THROAT: No redness or lesions.  NECK: 4cm circular dry erythematous patch  CHEST: Lungs are clear to auscultation and no retractions.  CARDIO: No murmur or cardiac rhythm irregularities.  SKIN: No rashes, lesions.    ASSESSM[BS1.1M]ENT/PLAN[ER1.1T]  1. Tinea corporis  ketoconazole (NIZORAL) 2 % shampoo[BS1.2T]   - encourage to continue with topical cream, will add daily shampoo as well  - discussed treatment course can be 1month.[BS1.1M]    Medication changes:[ER1.1T] Yes.  Was advised on side effects and potential interactions of the new medication(s).  Was advised on the risks of not taking medication(s) or adhering to the medication schedule.[BS1.1M]    Immunizations given today?[ER1.1T] No.[BS1.1M]  See Orders:  Instructed to call if the problem worsens or does not improve within the next 24 to 48 hours.    Schedule follow-up:[ER1.1T] at next well child visit[BS1.1M]    Electronically Signed by:    Cheryle Bello MD , 9/8/2017[BS1.2T]        Revision History        User Key Date/Time User Provider Type Action    > BS1.2 09/08/17 02:40 PM Cheryle Bello MD Physician Sign     BS1.1 09/08/17 02:37 PM Cheryle Bello MD Physician      ER1.2 09/08/17 01:04 PM Owen Santos MA Medical Assistant Sign at close encounter     ER1.1 09/08/17 01:01 PM Owen Santos MA Medical Assistant     M - Manual, T - Template             DISCHARGE

## 2022-11-16 NOTE — ED PROVIDER NOTE - ATTENDING CONTRIBUTION TO CARE
I conducted a face-to-face interaction with patient and I agree with resident documentation and plan.  Pt presents with sore throat, fever, myalgias, headache x about 2 days  Exam:  General:  Well-appearing, NAD  Lungs:  CTAB, no resp distress  Heart: Tachycardic, normal S1/S2  Abd:  Soft and nontender  A/P:  Code sepsis with labs, IVF, urine, CXR, ABx, cultures, RVP, reassess

## 2022-11-16 NOTE — ED PROVIDER NOTE - NSFOLLOWUPINSTRUCTIONS_ED_ALL_ED_FT
You were seen today for fever and flu-like symptoms. Our testing today included blood work     Urine studies and chest x-ray. All of these exams are normal. We performed a viral panel as well which showed that you have influenza.    Stay well-hydrated. Symptoms should improve within 7 days.    Take 500-1000mg acetaminophen (tylenol) every 6-8 hours as needed  Take 400-600mg ibuprofen (advil or motrin) every 6-8 hours as needed, take with food    Please return to the Emergency Department should you experience any of the following:  - Chest pain, Palpitations, Painful breathing  - Worsening or persistent shortness of breath  - Fever, unexplained weight loss, night sweats  - Blood in stool or in urine  - New weakness, fatigue, or fainting  - Any new concerning symptoms

## 2022-11-16 NOTE — ED ADULT TRIAGE NOTE - HEART RATE (BEATS/MIN)
TRANSFER - IN REPORT: 
 
Verbal report received from [facility]  on Yen Dill  being received from [address] for dialysis Report consisted of patients Situation, Background, Assessment and  
Recommendations(SBAR). Information from the following report(s) SBAR  was reviewed with the receiving nurse. Opportunity for questions and clarification was provided. Assessment completed upon patients arrival to unit and care assumed. 118

## 2022-11-16 NOTE — ED ADULT NURSE NOTE - CAS TRG GEN SKIN COLOR
Dear Heather, Even though he is hydrating well, the elevated sodium looks like a renin-aldosterone response to dehydration.  Sorry I didn't come up with anything more helpful. Angel
Normal for race

## 2022-11-16 NOTE — ED ADULT NURSE NOTE - CAPILLARY REFILL
December 15, 2021     Patient: Pierre Tovar   YOB: 2005   Date of Visit: 12/15/2021       To Whom it May Concern:    Pierre Tovar was seen in my clinic on 12/15/2021 at 8:45 pm.    Please excuse Mary Kay Ca for his absence from work on the date listed above to be able to make his appointment. Sincerely,         [address]    Medical information is confidential and cannot be disclosed without the written consent of the patient or his representative. 2 seconds or less

## 2022-11-16 NOTE — ED PROVIDER NOTE - PROGRESS NOTE DETAILS
Aries Levine, PGY-3: Pt reexamined at bedside, resting comfortably, vitals stable. appears much improved, headache relieved, no longer having back pain. UA and CXR pending. Flu positive chest x-ray clear urine negative blood work normal patient now at baseline and well-appearing. Will DC home with flu instructions.

## 2022-11-16 NOTE — ED PROVIDER NOTE - PATIENT PORTAL LINK FT
You can access the FollowMyHealth Patient Portal offered by [facility] by registering at the following website: [email or web address]. By joining [facility]’s FollowMyHealth portal, you will also be able to view your health information using other applications (apps) compatible with our system.

## 2022-11-16 NOTE — ED ADULT TRIAGE NOTE - BMI (KG/M2)
-- DO NOT REPLY / DO NOT REPLY ALL --  -- Message is from the [facility]--    COVID-19 Universal Screening: N/A - Not about scheduling    General Patient Message      Reason for Call: na    Caller Information       Type Contact Phone    06/05/2021 12:57 PM CDT Phone (Incoming) JUAN TODD (Father) [phone]          Alternative phone number: na    Turnaround time given to caller:   \"This message will be sent to [state Provider's name]. The clinical team will fulfill your request as soon as they review your message.\"    
27.5

## 2022-11-16 NOTE — ED PROVIDER NOTE - CLINICAL SUMMARY MEDICAL DECISION MAKING FREE TEXT BOX
26yo F With 2 days of URI symptoms including myalgias sore throat cough headache. Currently febrile and tachycardia meeting sepsis flags, suspect viral etiology however given current labs sore throat and suprapubic tenderness will treat as sepsis until otherwise indicated. Ceftriaxone to cover for pulmonary or urinary source. CBC CMP blood cultures and urinalysis. No meningismus to suggest meningitis. No signs of cord compression or cauda equina given no saddle anesthesia or lower extremity weakness or numbness. Will reevaluate after medications and defervescence

## 2022-11-16 NOTE — ED PROVIDER NOTE - OBJECTIVE STATEMENT
26yo F pmh migraine disorder presenting to emergency department with 2 days of sore throat myalgias back pain and headache. Symptoms have been getting worse since onset, was gradual. Back pain is constant diffuse from the lower neck to the lower back. Aching in quality nonradiating worse with movement. Patient reports having fevers over the same period of time. Sore throat is constant however not limiting p.o. intake. Has only taken one round of Tylenol at home. Denies any sick contacts or recent travel. Negative COVID test at home. Headache throbbing diffuse constant worse with coughing.

## 2022-11-16 NOTE — ED PROVIDER NOTE - PHYSICAL EXAMINATION
GENERAL: non-toxic appearing, alert, in moderate distress complaining of headache and back pain and sore throat  HEENT: atraumatic, normocephalic, Vision grossly intact, no conjunctivitis or discharge, hearing grossly intact,  no nasal discharge, epistaxis. Uvula midline, no tonsillar exudate, no shifting of retropharyngeal structures or tongue elevation.   CARDIAC: RRR, normal S1S2,  no appreciable murmurs, no cyanosis, cap refill < 2 seconds  PULM: normal work of breathing, oxygen saturation on RA wnl, CTAB, no crackles, rales, rhonchi, or wheezing  GI: abdomen nondistended, soft, mild suprapubic tenderness, no guarding or rebound tenderness, no palpable masses  NEURO: awake and alert, follows commands, normal speech, PERRLA, EOMI, no focal motor or sensory deficits  MSK: spine appears normal, no joint swelling or erythema, ranging all extremities with no appreciable loss of ROM  EXT: no peripheral edema, calf tenderness, redness or swelling  SKIN: warm, dry, and intact, no rashes  PSYCH: appropriate mood and affect

## 2022-11-21 LAB
CULTURE RESULTS: SIGNIFICANT CHANGE UP
CULTURE RESULTS: SIGNIFICANT CHANGE UP
SPECIMEN SOURCE: SIGNIFICANT CHANGE UP
SPECIMEN SOURCE: SIGNIFICANT CHANGE UP

## 2022-11-30 ENCOUNTER — APPOINTMENT (OUTPATIENT)
Dept: INTERNAL MEDICINE | Facility: CLINIC | Age: 27
End: 2022-11-30

## 2022-11-30 PROCEDURE — 99213 OFFICE O/P EST LOW 20 MIN: CPT

## 2022-11-30 NOTE — PHYSICAL EXAM
[No Acute Distress] : no acute distress [Well Nourished] : well nourished [Well Developed] : well developed [Well-Appearing] : well-appearing [Normal Sclera/Conjunctiva] : normal sclera/conjunctiva [PERRL] : pupils equal round and reactive to light [EOMI] : extraocular movements intact [Normal Outer Ear/Nose] : the outer ears and nose were normal in appearance [Normal Oropharynx] : the oropharynx was normal [No JVD] : no jugular venous distention [No Lymphadenopathy] : no lymphadenopathy [Supple] : supple [Thyroid Normal, No Nodules] : the thyroid was normal and there were no nodules present [No Respiratory Distress] : no respiratory distress  [No Accessory Muscle Use] : no accessory muscle use [Normal Rate] : normal rate  [Regular Rhythm] : with a regular rhythm [Normal S1, S2] : normal S1 and S2 [No Murmur] : no murmur heard [No Carotid Bruits] : no carotid bruits [No Abdominal Bruit] : a ~M bruit was not heard ~T in the abdomen [No Varicosities] : no varicosities [Pedal Pulses Present] : the pedal pulses are present [No Edema] : there was no peripheral edema [No Palpable Aorta] : no palpable aorta [No Extremity Clubbing/Cyanosis] : no extremity clubbing/cyanosis [Soft] : abdomen soft [Non Tender] : non-tender [Non-distended] : non-distended [No Masses] : no abdominal mass palpated [No HSM] : no HSM [Normal Bowel Sounds] : normal bowel sounds [Normal Posterior Cervical Nodes] : no posterior cervical lymphadenopathy [Normal Anterior Cervical Nodes] : no anterior cervical lymphadenopathy [No CVA Tenderness] : no CVA  tenderness [No Spinal Tenderness] : no spinal tenderness [No Joint Swelling] : no joint swelling [Grossly Normal Strength/Tone] : grossly normal strength/tone [No Rash] : no rash [Coordination Grossly Intact] : coordination grossly intact [No Focal Deficits] : no focal deficits [Normal Gait] : normal gait [Deep Tendon Reflexes (DTR)] : deep tendon reflexes were 2+ and symmetric [Normal Affect] : the affect was normal [Normal Insight/Judgement] : insight and judgment were intact [de-identified] : rough BS,b/l

## 2022-11-30 NOTE — HISTORY OF PRESENT ILLNESS
[de-identified] : 28 yo asymptomatic pt.\par PMH:LORE, 2/2 menses.not on iron supplements.\par h/o stable hepatic focal nodular hyperplasia/FNH,seen on us,MRI abd.LFT's wnl\par smoking+

## 2022-11-30 NOTE — COUNSELING
[Cessation strategies including cessation program discussed] : Cessation strategies including cessation program discussed [Use of nicotine replacement therapies and other medications discussed] : Use of nicotine replacement therapies and other medications discussed [Encouraged to pick a quit date and identify support needed to quit] : Encouraged to pick a quit date and identify support needed to quit [Smoking Cessation Program Referral] : Smoking Cessation Program Referral  [No] : Not willing to quit smoking [de-identified] : healthy eating,exercise [None] : None [Good understanding] : Patient has a good understanding of lifestyle changes and steps needed to achieve self management goal

## 2022-11-30 NOTE — HEALTH RISK ASSESSMENT
[Current] : Current [10-14] : 10-14 [No] : In the past 12 months have you used drugs other than those required for medical reasons? No [No falls in past year] : Patient reported no falls in the past year [0] : 2) Feeling down, depressed, or hopeless: Not at all (0) [[id number]] : 0

## 2022-11-30 NOTE — ASSESSMENT
[FreeTextEntry1] : 26 yo with h/o mild LORE,h/o stable FNH of the liver,smoking,rough BS on PE.\par labs ,smoking cessation,will f/u.

## 2022-12-09 ENCOUNTER — NON-APPOINTMENT (OUTPATIENT)
Age: 27
End: 2022-12-09

## 2022-12-09 LAB
ALBUMIN SERPL ELPH-MCNC: 4.3 G/DL
ALP BLD-CCNC: 50 U/L
ALT SERPL-CCNC: 17 U/L
ANION GAP SERPL CALC-SCNC: 11 MMOL/L
AST SERPL-CCNC: 14 U/L
BASOPHILS # BLD AUTO: 0.05 K/UL
BASOPHILS NFR BLD AUTO: 1 %
BILIRUB SERPL-MCNC: 0.2 MG/DL
BUN SERPL-MCNC: 9 MG/DL
CALCIUM SERPL-MCNC: 9.2 MG/DL
CHLORIDE SERPL-SCNC: 103 MMOL/L
CHOLEST SERPL-MCNC: 220 MG/DL
CO2 SERPL-SCNC: 26 MMOL/L
CREAT SERPL-MCNC: 0.63 MG/DL
EGFR: 123 ML/MIN/1.73M2
EOSINOPHIL # BLD AUTO: 0.11 K/UL
EOSINOPHIL NFR BLD AUTO: 2.1 %
ESTIMATED AVERAGE GLUCOSE: 120 MG/DL
FERRITIN SERPL-MCNC: 20 NG/ML
GLUCOSE SERPL-MCNC: 92 MG/DL
HBA1C MFR BLD HPLC: 5.8 %
HCT VFR BLD CALC: 42.3 %
HDLC SERPL-MCNC: 45 MG/DL
HGB BLD-MCNC: 12.8 G/DL
IMM GRANULOCYTES NFR BLD AUTO: 0.2 %
IRON SATN MFR SERPL: 21 %
IRON SERPL-MCNC: 62 UG/DL
LDLC SERPL CALC-MCNC: 140 MG/DL
LYMPHOCYTES # BLD AUTO: 2.05 K/UL
LYMPHOCYTES NFR BLD AUTO: 40 %
MAN DIFF?: NORMAL
MCHC RBC-ENTMCNC: 28.2 PG
MCHC RBC-ENTMCNC: 30.3 GM/DL
MCV RBC AUTO: 93.2 FL
MONOCYTES # BLD AUTO: 0.49 K/UL
MONOCYTES NFR BLD AUTO: 9.6 %
NEUTROPHILS # BLD AUTO: 2.42 K/UL
NEUTROPHILS NFR BLD AUTO: 47.1 %
NONHDLC SERPL-MCNC: 175 MG/DL
PLATELET # BLD AUTO: 264 K/UL
POTASSIUM SERPL-SCNC: 4 MMOL/L
PROT SERPL-MCNC: 6.8 G/DL
RBC # BLD: 4.54 M/UL
RBC # FLD: 12.9 %
SODIUM SERPL-SCNC: 139 MMOL/L
TIBC SERPL-MCNC: 292 UG/DL
TRIGL SERPL-MCNC: 173 MG/DL
TSH SERPL-ACNC: 1.24 UIU/ML
UIBC SERPL-MCNC: 230 UG/DL
WBC # FLD AUTO: 5.13 K/UL

## 2022-12-30 ENCOUNTER — EMERGENCY (EMERGENCY)
Facility: HOSPITAL | Age: 27
LOS: 1 days | Discharge: HOME | End: 2022-12-30
Attending: EMERGENCY MEDICINE
Payer: MEDICAID

## 2022-12-30 VITALS
RESPIRATION RATE: 18 BRPM | SYSTOLIC BLOOD PRESSURE: 105 MMHG | OXYGEN SATURATION: 100 % | HEART RATE: 83 BPM | WEIGHT: 160.94 LBS | HEIGHT: 64 IN | DIASTOLIC BLOOD PRESSURE: 71 MMHG | TEMPERATURE: 99 F

## 2022-12-30 VITALS
OXYGEN SATURATION: 100 % | SYSTOLIC BLOOD PRESSURE: 102 MMHG | DIASTOLIC BLOOD PRESSURE: 71 MMHG | TEMPERATURE: 98 F | HEART RATE: 78 BPM | RESPIRATION RATE: 18 BRPM

## 2022-12-30 PROBLEM — G43.909 MIGRAINE, UNSPECIFIED, NOT INTRACTABLE, WITHOUT STATUS MIGRAINOSUS: Chronic | Status: ACTIVE | Noted: 2022-11-16

## 2022-12-30 LAB
ALBUMIN SERPL ELPH-MCNC: 3.1 G/DL — LOW (ref 3.5–5)
ALP SERPL-CCNC: 44 U/L — SIGNIFICANT CHANGE UP (ref 40–120)
ALT FLD-CCNC: 27 U/L DA — SIGNIFICANT CHANGE UP (ref 10–60)
ANION GAP SERPL CALC-SCNC: 6 MMOL/L — SIGNIFICANT CHANGE UP (ref 5–17)
AST SERPL-CCNC: 16 U/L — SIGNIFICANT CHANGE UP (ref 10–40)
BASOPHILS # BLD AUTO: 0.04 K/UL — SIGNIFICANT CHANGE UP (ref 0–0.2)
BASOPHILS NFR BLD AUTO: 0.8 % — SIGNIFICANT CHANGE UP (ref 0–2)
BILIRUB SERPL-MCNC: 0.2 MG/DL — SIGNIFICANT CHANGE UP (ref 0.2–1.2)
BUN SERPL-MCNC: 6 MG/DL — LOW (ref 7–18)
CALCIUM SERPL-MCNC: 8.7 MG/DL — SIGNIFICANT CHANGE UP (ref 8.4–10.5)
CHLORIDE SERPL-SCNC: 110 MMOL/L — HIGH (ref 96–108)
CO2 SERPL-SCNC: 23 MMOL/L — SIGNIFICANT CHANGE UP (ref 22–31)
CREAT SERPL-MCNC: 0.68 MG/DL — SIGNIFICANT CHANGE UP (ref 0.5–1.3)
EGFR: 121 ML/MIN/1.73M2 — SIGNIFICANT CHANGE UP
EOSINOPHIL # BLD AUTO: 0.09 K/UL — SIGNIFICANT CHANGE UP (ref 0–0.5)
EOSINOPHIL NFR BLD AUTO: 1.8 % — SIGNIFICANT CHANGE UP (ref 0–6)
GLUCOSE SERPL-MCNC: 115 MG/DL — HIGH (ref 70–99)
HCG SERPL-ACNC: <1 MIU/ML — SIGNIFICANT CHANGE UP
HCT VFR BLD CALC: 39.1 % — SIGNIFICANT CHANGE UP (ref 34.5–45)
HGB BLD-MCNC: 12 G/DL — SIGNIFICANT CHANGE UP (ref 11.5–15.5)
IMM GRANULOCYTES NFR BLD AUTO: 0.2 % — SIGNIFICANT CHANGE UP (ref 0–0.9)
LYMPHOCYTES # BLD AUTO: 1.97 K/UL — SIGNIFICANT CHANGE UP (ref 1–3.3)
LYMPHOCYTES # BLD AUTO: 40 % — SIGNIFICANT CHANGE UP (ref 13–44)
MCHC RBC-ENTMCNC: 27.7 PG — SIGNIFICANT CHANGE UP (ref 27–34)
MCHC RBC-ENTMCNC: 30.7 GM/DL — LOW (ref 32–36)
MCV RBC AUTO: 90.3 FL — SIGNIFICANT CHANGE UP (ref 80–100)
MONOCYTES # BLD AUTO: 0.51 K/UL — SIGNIFICANT CHANGE UP (ref 0–0.9)
MONOCYTES NFR BLD AUTO: 10.4 % — SIGNIFICANT CHANGE UP (ref 2–14)
NEUTROPHILS # BLD AUTO: 2.3 K/UL — SIGNIFICANT CHANGE UP (ref 1.8–7.4)
NEUTROPHILS NFR BLD AUTO: 46.8 % — SIGNIFICANT CHANGE UP (ref 43–77)
NRBC # BLD: 0 /100 WBCS — SIGNIFICANT CHANGE UP (ref 0–0)
PLATELET # BLD AUTO: 204 K/UL — SIGNIFICANT CHANGE UP (ref 150–400)
POTASSIUM SERPL-MCNC: 4.3 MMOL/L — SIGNIFICANT CHANGE UP (ref 3.5–5.3)
POTASSIUM SERPL-SCNC: 4.3 MMOL/L — SIGNIFICANT CHANGE UP (ref 3.5–5.3)
PROT SERPL-MCNC: 6.8 G/DL — SIGNIFICANT CHANGE UP (ref 6–8.3)
RBC # BLD: 4.33 M/UL — SIGNIFICANT CHANGE UP (ref 3.8–5.2)
RBC # FLD: 12.5 % — SIGNIFICANT CHANGE UP (ref 10.3–14.5)
SODIUM SERPL-SCNC: 139 MMOL/L — SIGNIFICANT CHANGE UP (ref 135–145)
TROPONIN I, HIGH SENSITIVITY RESULT: 3.4 NG/L — SIGNIFICANT CHANGE UP
WBC # BLD: 4.92 K/UL — SIGNIFICANT CHANGE UP (ref 3.8–10.5)
WBC # FLD AUTO: 4.92 K/UL — SIGNIFICANT CHANGE UP (ref 3.8–10.5)

## 2022-12-30 PROCEDURE — 99284 EMERGENCY DEPT VISIT MOD MDM: CPT | Mod: 25

## 2022-12-30 PROCEDURE — 36415 COLL VENOUS BLD VENIPUNCTURE: CPT

## 2022-12-30 PROCEDURE — 85025 COMPLETE CBC W/AUTO DIFF WBC: CPT

## 2022-12-30 PROCEDURE — 99284 EMERGENCY DEPT VISIT MOD MDM: CPT

## 2022-12-30 PROCEDURE — 80053 COMPREHEN METABOLIC PANEL: CPT

## 2022-12-30 PROCEDURE — 84484 ASSAY OF TROPONIN QUANT: CPT

## 2022-12-30 PROCEDURE — 96374 THER/PROPH/DIAG INJ IV PUSH: CPT

## 2022-12-30 PROCEDURE — 93005 ELECTROCARDIOGRAM TRACING: CPT

## 2022-12-30 PROCEDURE — 84702 CHORIONIC GONADOTROPIN TEST: CPT

## 2022-12-30 RX ORDER — CYCLOBENZAPRINE HYDROCHLORIDE 10 MG/1
10 TABLET, FILM COATED ORAL ONCE
Refills: 0 | Status: COMPLETED | OUTPATIENT
Start: 2022-12-30 | End: 2022-12-30

## 2022-12-30 RX ORDER — KETOROLAC TROMETHAMINE 30 MG/ML
30 SYRINGE (ML) INJECTION ONCE
Refills: 0 | Status: DISCONTINUED | OUTPATIENT
Start: 2022-12-30 | End: 2022-12-30

## 2022-12-30 RX ORDER — IBUPROFEN 200 MG
1 TABLET ORAL
Qty: 21 | Refills: 0
Start: 2022-12-30 | End: 2023-01-05

## 2022-12-30 RX ORDER — CYCLOBENZAPRINE HYDROCHLORIDE 10 MG/1
1 TABLET, FILM COATED ORAL
Qty: 21 | Refills: 0
Start: 2022-12-30 | End: 2023-01-05

## 2022-12-30 RX ADMIN — CYCLOBENZAPRINE HYDROCHLORIDE 10 MILLIGRAM(S): 10 TABLET, FILM COATED ORAL at 06:06

## 2022-12-30 RX ADMIN — Medication 30 MILLIGRAM(S): at 06:06

## 2022-12-30 NOTE — ED ADULT NURSE NOTE - ED STAT RN HANDOFF DETAILS
Patient is alert and oriented x 3. Not in any form of distress. Endorsed to BLANCA Hsu for continuity of care.

## 2022-12-30 NOTE — ED ADULT TRIAGE NOTE - CHIEF COMPLAINT QUOTE
patient states " I have pain in my chest started in the evening, it hurts more when I raise my arm and when I breath. "

## 2022-12-30 NOTE — ED PROVIDER NOTE - PATIENT PORTAL LINK FT
You can access the FollowMyHealth Patient Portal offered by [facility] by registering at the following website: [email or web address]. By joining [facility]’s FollowMyHealth portal, you will also be able to view your health information using other applications (apps) compatible with our system.

## 2022-12-30 NOTE — ED ADULT NURSE NOTE - NSIMPLEMENTINTERV_GEN_ALL_ED
Implemented All Universal Safety Interventions:  [place] to call system. Call bell, personal items and telephone within reach. Instruct patient to call for assistance. Room bathroom lighting operational. Non-slip footwear when patient is off stretcher. Physically safe environment: no spills, clutter or unnecessary equipment. Stretcher in lowest position, wheels locked, appropriate side rails in place.

## 2022-12-30 NOTE — ED PROVIDER NOTE - NSICDXPASTMEDICALHX_GEN_ALL_CORE_FT
PAST MEDICAL HISTORY:  Migraine     No pertinent past medical history     No pertinent past medical history

## 2022-12-30 NOTE — ED PROVIDER NOTE - CARDIAC, MLM
Normal rate, regular rhythm.  Heart sounds S1, S2.  No murmurs, rubs or gallops. ttp left sternal area. no skin changes. no swelling. no LE swelling

## 2022-12-30 NOTE — ED PROVIDER NOTE - NSFOLLOWUPINSTRUCTIONS_ED_ALL_ED_FT
[facility] [place]                                                                                                                     Chest Wall Pain      Chest wall pain is pain in or around the bones and muscles of your chest. Sometimes, an injury causes this pain. Excessive coughing or overuse of arm and chest muscles may also cause chest wall pain. Sometimes, the cause may not be known. This pain may take several weeks or longer to get better.      Follow these instructions at home:      Managing pain, stiffness, and swelling   A bag of ice on a towel on the skin. •If directed, put ice on the painful area:  •Put ice in a plastic bag.      •Place a towel between your skin and the bag.      •Leave the ice on for 20 minutes, 2–3 times per day.        Activity     •Rest as told by your health care provider.      •Avoid activities that cause pain. These include any activities that use your chest muscles or your abdominal and side muscles to lift heavy items. Ask your health care provider what activities are safe for you.        General instructions   A do not smoke cigarettes sign.   •Take over-the-counter and prescription medicines only as told by your health care provider.      • Do not use any products that contain nicotine or tobacco, such as cigarettes, e-cigarettes, and chewing tobacco. These can delay healing after injury. If you need help quitting, ask your health care provider.      •Keep all follow-up visits as told by your health care provider. This is important.        Contact a health care provider if:    •You have a fever.      •Your chest pain becomes worse.      •You have new symptoms.        Get help right away if:    •You have nausea or vomiting.      •You feel sweaty or light-headed.      •You have a cough with mucus from your lungs (sputum) or you cough up blood.      •You develop shortness of breath.      These symptoms may represent a serious problem that is an emergency. Do not wait to see if the symptoms will go away. Get medical help right away. Call your local emergency services (911 in the [place] Do not drive yourself to the hospital.       Summary    •Chest wall pain is pain in or around the bones and muscles of your chest.      •Depending on the cause, it may be treated with ice, rest, medicines, and avoiding activities that cause pain.      •Contact a health care provider if you have a fever, worsening chest pain, or new symptoms.      •Get help right away if you feel light-headed or you develop shortness of breath. These symptoms may be an emergency.      This information is not intended to replace advice given to you by your health care provider. Make sure you discuss any questions you have with your health care provider.      Document Revised: 03/04/2022 Document Reviewed: 03/04/2022    Elsevier Patient Education © 2022 [employer]

## 2022-12-30 NOTE — ED PROVIDER NOTE - OBJECTIVE STATEMENT
27 year old female denies PMH coming in with left sternal chest pain worsened with movement and breathing since yesterday that started when lifting a box of cups while at work. states thought it would improve but overnight still with pain so came to the ed for eval. denies radiation of pain. denies all other complaints.

## 2022-12-30 NOTE — ED PROVIDER NOTE - PROGRESS NOTE DETAILS
ecg NSR, RRR, no acute changes. labs unremarkable. feels improved with meds. reproducible pain. likely msk. rx for meds. f/u with PMD. return precautions discussed.

## 2023-03-28 NOTE — ED ADULT TRIAGE NOTE - HEIGHT IN INCHES
1 O-Z Flap Text: The defect edges were debeveled with a #15 scalpel blade. Given the location of the defect, shape of the defect and the proximity to free margins an O-Z flap was deemed most appropriate. Using a sterile surgical marker, an appropriate transposition flap was drawn incorporating the defect and placing the expected incisions within the relaxed skin tension lines where possible. The area thus outlined was incised deep to adipose tissue with a #15 scalpel blade. The skin margins were undermined to an appropriate distance in all directions utilizing iris scissors. Following this, the designed flap was carried over into the primary defect and sutured into place.

## 2023-05-16 ENCOUNTER — APPOINTMENT (OUTPATIENT)
Dept: INTERNAL MEDICINE | Facility: CLINIC | Age: 28
End: 2023-05-16
Payer: MEDICAID

## 2023-05-16 VITALS
WEIGHT: 156 LBS | RESPIRATION RATE: 16 BRPM | HEIGHT: 64 IN | BODY MASS INDEX: 26.63 KG/M2 | DIASTOLIC BLOOD PRESSURE: 72 MMHG | SYSTOLIC BLOOD PRESSURE: 114 MMHG | OXYGEN SATURATION: 99 % | HEART RATE: 78 BPM | TEMPERATURE: 98.7 F

## 2023-05-16 DIAGNOSIS — Z00.00 ENCOUNTER FOR GENERAL ADULT MEDICAL EXAMINATION W/OUT ABNORMAL FINDINGS: ICD-10-CM

## 2023-05-16 PROCEDURE — 99385 PREV VISIT NEW AGE 18-39: CPT

## 2023-05-19 ENCOUNTER — NON-APPOINTMENT (OUTPATIENT)
Age: 28
End: 2023-05-19

## 2023-05-19 LAB
ALBUMIN SERPL ELPH-MCNC: 4.4 G/DL
ALP BLD-CCNC: 56 U/L
ALT SERPL-CCNC: 14 U/L
ANION GAP SERPL CALC-SCNC: 12 MMOL/L
AST SERPL-CCNC: 15 U/L
BASOPHILS # BLD AUTO: 0.04 K/UL
BASOPHILS NFR BLD AUTO: 0.6 %
BILIRUB DIRECT SERPL-MCNC: 0.1 MG/DL
BILIRUB INDIRECT SERPL-MCNC: 0.1 MG/DL
BILIRUB SERPL-MCNC: 0.2 MG/DL
BUN SERPL-MCNC: 9 MG/DL
CALCIUM SERPL-MCNC: 9.3 MG/DL
CHLORIDE SERPL-SCNC: 104 MMOL/L
CHOLEST SERPL-MCNC: 203 MG/DL
CO2 SERPL-SCNC: 24 MMOL/L
CREAT SERPL-MCNC: 0.6 MG/DL
EGFR: 124 ML/MIN/1.73M2
EOSINOPHIL # BLD AUTO: 0.06 K/UL
EOSINOPHIL NFR BLD AUTO: 0.9 %
ESTIMATED AVERAGE GLUCOSE: 114 MG/DL
GLUCOSE SERPL-MCNC: 59 MG/DL
HBA1C MFR BLD HPLC: 5.6 %
HCT VFR BLD CALC: 41.5 %
HDLC SERPL-MCNC: 50 MG/DL
HGB BLD-MCNC: 12.6 G/DL
IMM GRANULOCYTES NFR BLD AUTO: 0.3 %
LDLC SERPL CALC-MCNC: 121 MG/DL
LYMPHOCYTES # BLD AUTO: 2.16 K/UL
LYMPHOCYTES NFR BLD AUTO: 32.2 %
MAN DIFF?: NORMAL
MCHC RBC-ENTMCNC: 28.6 PG
MCHC RBC-ENTMCNC: 30.4 GM/DL
MCV RBC AUTO: 94.3 FL
MONOCYTES # BLD AUTO: 0.66 K/UL
MONOCYTES NFR BLD AUTO: 9.8 %
NEUTROPHILS # BLD AUTO: 3.77 K/UL
NEUTROPHILS NFR BLD AUTO: 56.2 %
NONHDLC SERPL-MCNC: 152 MG/DL
PLATELET # BLD AUTO: 218 K/UL
POTASSIUM SERPL-SCNC: 3.9 MMOL/L
PROT SERPL-MCNC: 6.9 G/DL
RBC # BLD: 4.4 M/UL
RBC # FLD: 13 %
SODIUM SERPL-SCNC: 140 MMOL/L
TRIGL SERPL-MCNC: 156 MG/DL
WBC # FLD AUTO: 6.71 K/UL

## 2023-05-19 NOTE — COUNSELING
[Yes] : Risk of tobacco use and health benefits of smoking cessation discussed: Yes [Cessation strategies including cessation program discussed] : Cessation strategies including cessation program discussed [Use of nicotine replacement therapies and other medications discussed] : Use of nicotine replacement therapies and other medications discussed [Encouraged to pick a quit date and identify support needed to quit] : Encouraged to pick a quit date and identify support needed to quit [No] : Not willing to quit smoking [None] : None [Good understanding] : Patient has a good understanding of lifestyle changes and steps needed to achieve self management goal [FreeTextEntry2] : refused [de-identified] : healthy eating,exercise

## 2023-05-19 NOTE — PHYSICAL EXAM
[No Acute Distress] : no acute distress [Well Nourished] : well nourished [Well Developed] : well developed [Well-Appearing] : well-appearing [Normal Sclera/Conjunctiva] : normal sclera/conjunctiva [PERRL] : pupils equal round and reactive to light [EOMI] : extraocular movements intact [Normal Outer Ear/Nose] : the outer ears and nose were normal in appearance [Normal Oropharynx] : the oropharynx was normal [No JVD] : no jugular venous distention [No Lymphadenopathy] : no lymphadenopathy [Supple] : supple [Thyroid Normal, No Nodules] : the thyroid was normal and there were no nodules present [No Respiratory Distress] : no respiratory distress  [No Accessory Muscle Use] : no accessory muscle use [Normal Rate] : normal rate  [Regular Rhythm] : with a regular rhythm [Normal S1, S2] : normal S1 and S2 [No Murmur] : no murmur heard [No Carotid Bruits] : no carotid bruits [No Abdominal Bruit] : a ~M bruit was not heard ~T in the abdomen [No Varicosities] : no varicosities [Pedal Pulses Present] : the pedal pulses are present [No Edema] : there was no peripheral edema [No Palpable Aorta] : no palpable aorta [No Extremity Clubbing/Cyanosis] : no extremity clubbing/cyanosis [Soft] : abdomen soft [Non Tender] : non-tender [Non-distended] : non-distended [No Masses] : no abdominal mass palpated [No HSM] : no HSM [Normal Bowel Sounds] : normal bowel sounds [No CVA Tenderness] : no CVA  tenderness [No Spinal Tenderness] : no spinal tenderness [No Joint Swelling] : no joint swelling [Grossly Normal Strength/Tone] : grossly normal strength/tone [No Rash] : no rash [Coordination Grossly Intact] : coordination grossly intact [No Focal Deficits] : no focal deficits [Normal Gait] : normal gait [Deep Tendon Reflexes (DTR)] : deep tendon reflexes were 2+ and symmetric [Normal Affect] : the affect was normal [Normal Insight/Judgement] : insight and judgment were intact [de-identified] : rough BS,b/l

## 2023-05-19 NOTE — ASSESSMENT
[FreeTextEntry1] : 27 yo with h/o mild LORE,h/o stable FNH of the liver,smoking(occ.),unremarkable  PE\par us abd,labs ,smoking cessation,will f/u.\par will obtain CxR report from  for h/o lung nodule.

## 2023-05-19 NOTE — HEALTH RISK ASSESSMENT
[Good] : ~his/her~  mood as  good [No] : In the past 12 months have you used drugs other than those required for medical reasons? No [No falls in past year] : Patient reported no falls in the past year [0] : 2) Feeling down, depressed, or hopeless: Not at all (0) [None] : None [Employed] : employed [Fully functional (bathing, dressing, toileting, transferring, walking, feeding)] : Fully functional (bathing, dressing, toileting, transferring, walking, feeding) [Fully functional (using the telephone, shopping, preparing meals, housekeeping, doing laundry, using] : Fully functional and needs no help or supervision to perform IADLs (using the telephone, shopping, preparing meals, housekeeping, doing laundry, using transportation, managing medications and managing finances) [Patient/Caregiver not ready to engage] : , patient/caregiver not ready to engage [Current] : Current [5-9] : 5-9 [[id number]] : 0 [Reports changes in hearing] : Reports no changes in hearing [Reports changes in vision] : Reports no changes in vision [Reports changes in dental health] : Reports no changes in dental health

## 2023-05-19 NOTE — HISTORY OF PRESENT ILLNESS
[FreeTextEntry1] : APARNA [de-identified] : 29 yo asymptomatic pt.\par pt visited [facility] few months ago for cough and was told she has a nodule in her lungs on CxR\par PMH:LORE, 2/2 menses.not on iron supplements.last Hb>12 g,6 months ago.\par h/o stable hepatic focal nodular hyperplasia/[facility],seen on us,MRI abd.LFT's wnl\par smoking occ

## 2023-06-14 ENCOUNTER — OUTPATIENT (OUTPATIENT)
Dept: OUTPATIENT SERVICES | Facility: HOSPITAL | Age: 28
LOS: 1 days | End: 2023-06-14
Payer: MEDICAID

## 2023-06-14 ENCOUNTER — APPOINTMENT (OUTPATIENT)
Dept: ULTRASOUND IMAGING | Facility: HOSPITAL | Age: 28
End: 2023-06-14
Payer: MEDICAID

## 2023-06-14 DIAGNOSIS — K76.89 OTHER SPECIFIED DISEASES OF LIVER: ICD-10-CM

## 2023-06-14 PROCEDURE — 76700 US EXAM ABDOM COMPLETE: CPT | Mod: 26

## 2023-06-14 PROCEDURE — 76700 US EXAM ABDOM COMPLETE: CPT

## 2023-07-16 ENCOUNTER — NON-APPOINTMENT (OUTPATIENT)
Age: 28
End: 2023-07-16

## 2023-10-03 ENCOUNTER — NON-APPOINTMENT (OUTPATIENT)
Age: 28
End: 2023-10-03

## 2023-10-26 ENCOUNTER — NON-APPOINTMENT (OUTPATIENT)
Age: 28
End: 2023-10-26

## 2023-12-27 ENCOUNTER — NON-APPOINTMENT (OUTPATIENT)
Age: 28
End: 2023-12-27

## 2024-02-19 ENCOUNTER — NON-APPOINTMENT (OUTPATIENT)
Age: 29
End: 2024-02-19

## 2024-03-18 ENCOUNTER — EMERGENCY (EMERGENCY)
Facility: HOSPITAL | Age: 29
LOS: 1 days | Discharge: HOME | End: 2024-03-18
Attending: EMERGENCY MEDICINE
Payer: SELF-PAY

## 2024-03-18 VITALS
OXYGEN SATURATION: 97 % | TEMPERATURE: 103 F | SYSTOLIC BLOOD PRESSURE: 111 MMHG | DIASTOLIC BLOOD PRESSURE: 64 MMHG | WEIGHT: 130.07 LBS | RESPIRATION RATE: 21 BRPM | HEART RATE: 111 BPM | HEIGHT: 64 IN

## 2024-03-18 VITALS
SYSTOLIC BLOOD PRESSURE: 110 MMHG | RESPIRATION RATE: 17 BRPM | OXYGEN SATURATION: 100 % | TEMPERATURE: 98 F | HEART RATE: 93 BPM | DIASTOLIC BLOOD PRESSURE: 67 MMHG

## 2024-03-18 LAB
ALBUMIN SERPL ELPH-MCNC: 3.6 G/DL — SIGNIFICANT CHANGE UP (ref 3.5–5)
ALP SERPL-CCNC: 53 U/L — SIGNIFICANT CHANGE UP (ref 40–120)
ALT FLD-CCNC: 19 U/L DA — SIGNIFICANT CHANGE UP (ref 10–60)
ANION GAP SERPL CALC-SCNC: 4 MMOL/L — LOW (ref 5–17)
APPEARANCE UR: ABNORMAL
APTT BLD: 33.1 SEC — SIGNIFICANT CHANGE UP (ref 24.5–35.6)
AST SERPL-CCNC: 12 U/L — SIGNIFICANT CHANGE UP (ref 10–40)
BASOPHILS # BLD AUTO: 0.04 K/UL — SIGNIFICANT CHANGE UP (ref 0–0.2)
BASOPHILS NFR BLD AUTO: 0.4 % — SIGNIFICANT CHANGE UP (ref 0–2)
BILIRUB SERPL-MCNC: 0.5 MG/DL — SIGNIFICANT CHANGE UP (ref 0.2–1.2)
BILIRUB UR-MCNC: NEGATIVE — SIGNIFICANT CHANGE UP
BUN SERPL-MCNC: 6 MG/DL — LOW (ref 7–18)
CALCIUM SERPL-MCNC: 8.9 MG/DL — SIGNIFICANT CHANGE UP (ref 8.4–10.5)
CHLORIDE SERPL-SCNC: 106 MMOL/L — SIGNIFICANT CHANGE UP (ref 96–108)
CO2 SERPL-SCNC: 27 MMOL/L — SIGNIFICANT CHANGE UP (ref 22–31)
COLOR SPEC: YELLOW — SIGNIFICANT CHANGE UP
CREAT SERPL-MCNC: 0.62 MG/DL — SIGNIFICANT CHANGE UP (ref 0.5–1.3)
DIFF PNL FLD: NEGATIVE — SIGNIFICANT CHANGE UP
EGFR: 123 ML/MIN/1.73M2 — SIGNIFICANT CHANGE UP
EOSINOPHIL # BLD AUTO: 0.01 K/UL — SIGNIFICANT CHANGE UP (ref 0–0.5)
EOSINOPHIL NFR BLD AUTO: 0.1 % — SIGNIFICANT CHANGE UP (ref 0–6)
GLUCOSE SERPL-MCNC: 111 MG/DL — HIGH (ref 70–99)
GLUCOSE UR QL: NEGATIVE MG/DL — SIGNIFICANT CHANGE UP
HCG SERPL-ACNC: <1 MIU/ML — SIGNIFICANT CHANGE UP
HCT VFR BLD CALC: 40.5 % — SIGNIFICANT CHANGE UP (ref 34.5–45)
HGB BLD-MCNC: 12.7 G/DL — SIGNIFICANT CHANGE UP (ref 11.5–15.5)
IMM GRANULOCYTES NFR BLD AUTO: 0.3 % — SIGNIFICANT CHANGE UP (ref 0–0.9)
INR BLD: 1.07 RATIO — SIGNIFICANT CHANGE UP (ref 0.85–1.18)
KETONES UR-MCNC: NEGATIVE MG/DL — SIGNIFICANT CHANGE UP
LACTATE SERPL-SCNC: 1.3 MMOL/L — SIGNIFICANT CHANGE UP (ref 0.7–2)
LEUKOCYTE ESTERASE UR-ACNC: NEGATIVE — SIGNIFICANT CHANGE UP
LYMPHOCYTES # BLD AUTO: 0.98 K/UL — LOW (ref 1–3.3)
LYMPHOCYTES # BLD AUTO: 9.5 % — LOW (ref 13–44)
MCHC RBC-ENTMCNC: 28.9 PG — SIGNIFICANT CHANGE UP (ref 27–34)
MCHC RBC-ENTMCNC: 31.4 GM/DL — LOW (ref 32–36)
MCV RBC AUTO: 92 FL — SIGNIFICANT CHANGE UP (ref 80–100)
MONOCYTES # BLD AUTO: 0.77 K/UL — SIGNIFICANT CHANGE UP (ref 0–0.9)
MONOCYTES NFR BLD AUTO: 7.4 % — SIGNIFICANT CHANGE UP (ref 2–14)
NEUTROPHILS # BLD AUTO: 8.53 K/UL — HIGH (ref 1.8–7.4)
NEUTROPHILS NFR BLD AUTO: 82.3 % — HIGH (ref 43–77)
NITRITE UR-MCNC: NEGATIVE — SIGNIFICANT CHANGE UP
NRBC # BLD: 0 /100 WBCS — SIGNIFICANT CHANGE UP (ref 0–0)
PH UR: 8 — SIGNIFICANT CHANGE UP (ref 5–8)
PLATELET # BLD AUTO: 226 K/UL — SIGNIFICANT CHANGE UP (ref 150–400)
POTASSIUM SERPL-MCNC: 3.6 MMOL/L — SIGNIFICANT CHANGE UP (ref 3.5–5.3)
POTASSIUM SERPL-SCNC: 3.6 MMOL/L — SIGNIFICANT CHANGE UP (ref 3.5–5.3)
PROT SERPL-MCNC: 7.5 G/DL — SIGNIFICANT CHANGE UP (ref 6–8.3)
PROT UR-MCNC: NEGATIVE MG/DL — SIGNIFICANT CHANGE UP
PROTHROM AB SERPL-ACNC: 12.2 SEC — SIGNIFICANT CHANGE UP (ref 9.5–13)
RAPID RVP RESULT: SIGNIFICANT CHANGE UP
RBC # BLD: 4.4 M/UL — SIGNIFICANT CHANGE UP (ref 3.8–5.2)
RBC # FLD: 12.6 % — SIGNIFICANT CHANGE UP (ref 10.3–14.5)
SARS-COV-2 RNA SPEC QL NAA+PROBE: SIGNIFICANT CHANGE UP
SODIUM SERPL-SCNC: 137 MMOL/L — SIGNIFICANT CHANGE UP (ref 135–145)
SP GR SPEC: 1.01 — SIGNIFICANT CHANGE UP (ref 1–1.03)
UROBILINOGEN FLD QL: 0.2 MG/DL — SIGNIFICANT CHANGE UP (ref 0.2–1)
WBC # BLD: 10.36 K/UL — SIGNIFICANT CHANGE UP (ref 3.8–10.5)
WBC # FLD AUTO: 10.36 K/UL — SIGNIFICANT CHANGE UP (ref 3.8–10.5)

## 2024-03-18 PROCEDURE — 71045 X-RAY EXAM CHEST 1 VIEW: CPT

## 2024-03-18 PROCEDURE — 87798 DETECT AGENT NOS DNA AMP: CPT

## 2024-03-18 PROCEDURE — 84702 CHORIONIC GONADOTROPIN TEST: CPT

## 2024-03-18 PROCEDURE — 85610 PROTHROMBIN TIME: CPT

## 2024-03-18 PROCEDURE — 80053 COMPREHEN METABOLIC PANEL: CPT

## 2024-03-18 PROCEDURE — 83605 ASSAY OF LACTIC ACID: CPT

## 2024-03-18 PROCEDURE — 0225U NFCT DS DNA&RNA 21 SARSCOV2: CPT

## 2024-03-18 PROCEDURE — 87086 URINE CULTURE/COLONY COUNT: CPT

## 2024-03-18 PROCEDURE — 93005 ELECTROCARDIOGRAM TRACING: CPT

## 2024-03-18 PROCEDURE — 36415 COLL VENOUS BLD VENIPUNCTURE: CPT

## 2024-03-18 PROCEDURE — 87651 STREP A DNA AMP PROBE: CPT

## 2024-03-18 PROCEDURE — 71045 X-RAY EXAM CHEST 1 VIEW: CPT | Mod: 26

## 2024-03-18 PROCEDURE — 85025 COMPLETE CBC W/AUTO DIFF WBC: CPT

## 2024-03-18 PROCEDURE — 81001 URINALYSIS AUTO W/SCOPE: CPT

## 2024-03-18 PROCEDURE — 96374 THER/PROPH/DIAG INJ IV PUSH: CPT

## 2024-03-18 PROCEDURE — 96375 TX/PRO/DX INJ NEW DRUG ADDON: CPT

## 2024-03-18 PROCEDURE — 87040 BLOOD CULTURE FOR BACTERIA: CPT

## 2024-03-18 PROCEDURE — 99285 EMERGENCY DEPT VISIT HI MDM: CPT | Mod: 25

## 2024-03-18 PROCEDURE — 85730 THROMBOPLASTIN TIME PARTIAL: CPT

## 2024-03-18 PROCEDURE — 99285 EMERGENCY DEPT VISIT HI MDM: CPT

## 2024-03-18 RX ORDER — SODIUM CHLORIDE 9 MG/ML
2000 INJECTION INTRAMUSCULAR; INTRAVENOUS; SUBCUTANEOUS ONCE
Refills: 0 | Status: COMPLETED | OUTPATIENT
Start: 2024-03-18 | End: 2024-03-18

## 2024-03-18 RX ORDER — AMPICILLIN SODIUM AND SULBACTAM SODIUM 250; 125 MG/ML; MG/ML
3 INJECTION, POWDER, FOR SUSPENSION INTRAMUSCULAR; INTRAVENOUS ONCE
Refills: 0 | Status: COMPLETED | OUTPATIENT
Start: 2024-03-18 | End: 2024-03-18

## 2024-03-18 RX ORDER — KETOROLAC TROMETHAMINE 30 MG/ML
15 SYRINGE (ML) INJECTION ONCE
Refills: 0 | Status: DISCONTINUED | OUTPATIENT
Start: 2024-03-18 | End: 2024-03-18

## 2024-03-18 RX ORDER — DEXAMETHASONE 0.5 MG/5ML
10 ELIXIR ORAL ONCE
Refills: 0 | Status: COMPLETED | OUTPATIENT
Start: 2024-03-18 | End: 2024-03-18

## 2024-03-18 RX ORDER — ACETAMINOPHEN 500 MG
975 TABLET ORAL ONCE
Refills: 0 | Status: COMPLETED | OUTPATIENT
Start: 2024-03-18 | End: 2024-03-18

## 2024-03-18 RX ORDER — SODIUM CHLORIDE 9 MG/ML
1000 INJECTION INTRAMUSCULAR; INTRAVENOUS; SUBCUTANEOUS ONCE
Refills: 0 | Status: COMPLETED | OUTPATIENT
Start: 2024-03-18 | End: 2024-03-18

## 2024-03-18 RX ADMIN — Medication 15 MILLIGRAM(S): at 21:11

## 2024-03-18 RX ADMIN — Medication 102 MILLIGRAM(S): at 17:13

## 2024-03-18 RX ADMIN — SODIUM CHLORIDE 1000 MILLILITER(S): 9 INJECTION INTRAMUSCULAR; INTRAVENOUS; SUBCUTANEOUS at 20:11

## 2024-03-18 RX ADMIN — Medication 975 MILLIGRAM(S): at 16:58

## 2024-03-18 RX ADMIN — AMPICILLIN SODIUM AND SULBACTAM SODIUM 200 GRAM(S): 250; 125 INJECTION, POWDER, FOR SUSPENSION INTRAMUSCULAR; INTRAVENOUS at 16:58

## 2024-03-18 RX ADMIN — Medication 975 MILLIGRAM(S): at 17:30

## 2024-03-18 RX ADMIN — Medication 15 MILLIGRAM(S): at 20:11

## 2024-03-18 RX ADMIN — SODIUM CHLORIDE 2000 MILLILITER(S): 9 INJECTION INTRAMUSCULAR; INTRAVENOUS; SUBCUTANEOUS at 16:54

## 2024-03-18 NOTE — ED PROVIDER NOTE - PATIENT PORTAL LINK FT
You can access the FollowMyHealth Patient Portal offered by [facility] by registering at the following website: [email or web address]. By joining [facility]’s FollowMyHealth portal, you will also be able to view your health information using other applications (apps) compatible with our system.

## 2024-03-18 NOTE — ED PROVIDER NOTE - CLINICAL SUMMARY MEDICAL DECISION MAKING FREE TEXT BOX
No abdominal pain or tenderness. No evidence of meningitis or endocarditis. No evidence of cellulitis. No evidence of pneumonia on exam or chest x-ray ___________. Exam/character lower suspicion for bacteremia to warrant admission. No urinary signs/symptoms or CVAT, and low suspicion for pyelonephritis, and UA __________. Exam c/w viral syndrome/URI. Exam low suspicion for PTA to warrant CT imaging. Airway widely patent. Patient appears well-hydrated, no work of breathing. Given IV fluids, Tylenol, Decadron, and one dose of Unasyn by sepsis, though again low suspicion for bacterial infection to warrant antibiotics for home, strep swab sent. No abdominal pain or tenderness. No evidence of meningitis or endocarditis. No evidence of cellulitis. No evidence of pneumonia on exam or chest x-ray. Exam/character lower suspicion for bacteremia to warrant admission. No urinary signs/symptoms or CVAT, and low suspicion for pyelonephritis, and UA unremarkable. Exam c/w viral syndrome/URI. Exam low suspicion for PTA to warrant CT imaging. Airway widely patent. Patient appears well-hydrated, no work of breathing. Given IV fluids, Tylenol, Decadron, and one dose of Unasyn by sepsis, though again low suspicion for bacterial infection to warrant antibiotics for home, strep swab sent. No abdominal pain or tenderness. No evidence of meningitis or endocarditis. No evidence of cellulitis. No evidence of pneumonia on exam or chest x-ray. Exam/character lower suspicion for bacteremia to warrant admission. No urinary signs/symptoms or CVAT, and low suspicion for pyelonephritis, and UA unremarkable. Exam c/w viral syndrome/URI. Exam low suspicion for PTA to warrant CT imaging. Airway widely patent. Patient appears well-hydrated, no work of breathing. Given IV fluids, Tylenol, Decadron, and one dose of Unasyn by sepsis, though again low suspicion for bacterial infection to warrant antibiotics for home, strep swab sent. On reassessment patient with significant improvement, eating well, and comfortable with discharge. Patient is well appearing, NAD, afebrile, hemodynamically stable. Any available tests and studies were discussed with patient. Discharged with instructions in further symptomatic care, return precautions, and need for PMD f/u.

## 2024-03-18 NOTE — ED PROVIDER NOTE - OBJECTIVE STATEMENT
28-year-old female with history of anxiety, no other past medical history, on no medications, presents with bodyaches. She states she has had generalized backache x 1 week, and then today began having whole body aches, shivering, sore throat, some intermittent nosebleeds. Last took ibuprofen 600 mg at noon. Family at home had a sore throat but has resolved. Denies all other symptoms including rash or skin infection, chest pain, shortness of breath, leg pain or swelling, recent long distance travel, abdominal pain, vomiting, diarrhea, urinary changes, cough, rhinorrhea, congestion, ear pain.

## 2024-03-18 NOTE — ED PROVIDER NOTE - PHYSICAL EXAMINATION
Febrile, hemodynamically stable, saturating well on room air  NAD, ill but nontoxic appearing, no WOB/tachypnea, speaking full sentences  Head NCAT, neck supple/full ROM  EOMI grossly, anicteric  MMM, generalized posterior palatal erythema with no peritonsillar swelling, no lesions or exudates  No JVD  RRR, nml S1/S2, no m/r/g  Lungs CTAB, no w/r/r  Abd soft, NT, ND, nml BS, no rebound or guarding, no CVAT  AAO, CN's 3-12 grossly intact  EDMONDS spontaneously, no leg cyanosis or edema  Skin warm, well perfused, no rashes or hives

## 2024-03-18 NOTE — ED PROVIDER NOTE - NSFOLLOWUPINSTRUCTIONS_ED_ALL_ED_FT
Please follow up with your primary care doctor in 1-2 days.  Please use ibuprofen or acetaminophen as needed for pain.  Please keep well hydrated.  Please return to the emergency department if you have worsening pain, vomiting, fever not resolving in 2 more days, or any other symptoms.    Pharyngitis    Upper body outline including the pharynx.   Pharyngitis is a sore throat (pharynx). This is when there is redness, pain, and swelling in your throat. Most of the time, this condition gets better on its own. In some cases, you may need medicine.    What are the causes?  •An infection from a virus.  •An infection from bacteria.  •Allergies.    What increases the risk?  •Being 5–24 years old.  •Being in crowded environments. These include:  •Daycares.  •Schools.  •Dormitories.  •Living in a place with cold temperatures outside.  •Having a weakened disease-fighting (immune) system.    What are the signs or symptoms?    Symptoms may vary depending on the cause. Common symptoms include:  •Sore throat.  •Tiredness (fatigue).  •Low-grade fever.  •Stuffy nose.  •Cough.  •Headache.    Other symptoms may include:  •Glands in the neck (lymph nodes) that are swollen.  •Skin rashes.  •Film on the throat or tonsils. This can be caused by an infection from bacteria.  •Vomiting.  •Red, itchy eyes.  •Loss of appetite.  •Joint pain and muscle aches.  •Tonsils that are temporarily bigger than usual (enlarged).    How is this treated?    Many times, treatment is not needed. This condition usually gets better in 3–4 days without treatment.    If the infection is caused by a bacteria, you may be need to take antibiotics.    Follow these instructions at home:    Medicines   •Take over-the-counter and prescription medicines only as told by your doctor.  •If you were prescribed an antibiotic medicine, take it as told by your doctor. Do not stop taking the antibiotic even if you start to feel better.  •Use throat lozenges or sprays to soothe your throat as told by your doctor.  •Children can get pharyngitis. Do not give your child aspirin.    Managing pain   A cup of hot tea.   To help with pain, try:•Sipping warm liquids, such as:  •Broth.  •Herbal tea.  •Warm water.  •Eating or drinking cold or frozen liquids, such as frozen ice pops.  •Rinsing your mouth (gargle) with a salt water mixture 3–4 times a day or as needed.  •To make salt water, dissolve ½–1 tsp (3–6 g) of salt in 1 cup (237 mL) of warm water.  •Do not swallow this mixture.  •Sucking on hard candy or throat lozenges.  •Putting a cool-mist humidifier in your bedroom at night to moisten the air.  •Sitting in the bathroom with the door closed for 5–10 minutes while you run hot water in the shower.    General instructions   A do not smoke cigarettes sign.   • Do not smoke or use any products that contain nicotine or tobacco. If you need help quitting, ask your doctor.  •Rest as told by your doctor.  •Drink enough fluid to keep your pee (urine) pale yellow.    How is this prevented?  •Wash your hands often for at least 20 seconds with soap and water. If soap and water are not available, use hand .  • Do not touch your eyes, nose, or mouth with unwashed hands. Wash hands after touching these areas.  • Do not share cups or eating utensils.  •Avoid close contact with people who are sick.    Contact a doctor if:  •You have large, tender lumps in your neck.  •You have a rash.  •You cough up green, yellow-brown, or bloody spit.    Get help right away if:  •You have a stiff neck.  •You drool or cannot swallow liquids.  •You cannot drink or take medicines without vomiting.  •You have very bad pain that does not go away with medicine.  •You have problems breathing, and it is not from a stuffy nose.  •You have new pain and swelling in your knees, ankles, wrists, or elbows.    These symptoms may be an emergency. Get help right away. Call your local emergency services (911 in the [place]   • Do not wait to see if the symptoms will go away.   • Do not drive yourself to the hospital.     Summary  •Pharyngitis is a sore throat (pharynx). This is when there is redness, pain, and swelling in your throat.  •Most of the time, pharyngitis gets better on its own. Sometimes, you may need medicine.  •If you were prescribed an antibiotic medicine, take it as told by your doctor. Do not stop taking the antibiotic even if you start to feel better.    This information is not intended to replace advice given to you by your health care provider. Make sure you discuss any questions you have with your health care provider.

## 2024-03-18 NOTE — ED ADULT NURSE NOTE - ISOLATION TYPE:
None Advancement Flap (Double) Text: The defect edges were debeveled with a #15 scalpel blade.  Given the location of the defect and the proximity to free margins a double advancement flap was deemed most appropriate.  Using a sterile surgical marker, the appropriate advancement flaps were drawn incorporating the defect and placing the expected incisions within the relaxed skin tension lines where possible.    The area thus outlined was incised deep to adipose tissue with a #15 scalpel blade.  The skin margins were undermined to an appropriate distance in all directions utilizing iris scissors.

## 2024-03-19 LAB
CULTURE RESULTS: SIGNIFICANT CHANGE UP
S PYO DNA THROAT QL NAA+PROBE: ABNORMAL
SPECIMEN SOURCE: SIGNIFICANT CHANGE UP

## 2024-03-20 RX ORDER — PENICILLIN V POTASIUM 500 MG/1
1 TABLET OROPHARYNGEAL
Qty: 20 | Refills: 0
Start: 2024-03-20 | End: 2024-03-29

## 2024-04-02 ENCOUNTER — APPOINTMENT (OUTPATIENT)
Dept: INTERNAL MEDICINE | Facility: CLINIC | Age: 29
End: 2024-04-02
Payer: SELF-PAY

## 2024-04-02 VITALS
HEART RATE: 79 BPM | SYSTOLIC BLOOD PRESSURE: 122 MMHG | DIASTOLIC BLOOD PRESSURE: 79 MMHG | BODY MASS INDEX: 25.78 KG/M2 | OXYGEN SATURATION: 100 % | TEMPERATURE: 98.6 F | WEIGHT: 151 LBS | HEIGHT: 64 IN | RESPIRATION RATE: 16 BRPM

## 2024-04-02 DIAGNOSIS — F17.200 NICOTINE DEPENDENCE, UNSPECIFIED, UNCOMPLICATED: ICD-10-CM

## 2024-04-02 PROCEDURE — 99395 PREV VISIT EST AGE 18-39: CPT

## 2024-04-02 RX ORDER — POLYETHYLENE GLYCOL 3350 AND ELECTROLYTES WITH LEMON FLAVOR 236; 22.74; 6.74; 5.86; 2.97 G/4L; G/4L; G/4L; G/4L; G/4L
236 POWDER, FOR SOLUTION ORAL
Qty: 1 | Refills: 0 | Status: DISCONTINUED | COMMUNITY
Start: 2021-12-20 | End: 2024-04-02

## 2024-04-02 RX ORDER — NICOTINE TRANSDERMAL SYSTEM 14 MG/24H
14 PATCH, EXTENDED RELEASE TRANSDERMAL DAILY
Qty: 45 | Refills: 0 | Status: DISCONTINUED | COMMUNITY
Start: 2021-06-05 | End: 2024-04-02

## 2024-04-02 RX ORDER — CHLORHEXIDINE GLUCONATE 4 %
325 (65 FE) LIQUID (ML) TOPICAL
Qty: 180 | Refills: 1 | Status: DISCONTINUED | COMMUNITY
Start: 2021-11-03 | End: 2024-04-02

## 2024-04-02 RX ORDER — ASCORBIC ACID 500 MG
100 TABLET ORAL DAILY
Qty: 30 | Refills: 2 | Status: DISCONTINUED | COMMUNITY
Start: 2021-06-05 | End: 2024-04-02

## 2024-04-02 RX ORDER — NORETHINDRONE ACETATE AND ETHINYL ESTRADIOL 1; 20 MG/1; UG/1
1-20 TABLET ORAL DAILY
Qty: 3 | Refills: 3 | Status: DISCONTINUED | COMMUNITY
Start: 2022-03-07 | End: 2024-04-02

## 2024-04-02 RX ORDER — NORTRIPTYLINE HYDROCHLORIDE 10 MG/1
10 CAPSULE ORAL
Qty: 90 | Refills: 0 | Status: ACTIVE | COMMUNITY
Start: 2021-12-09 | End: 1900-01-01

## 2024-04-02 RX ORDER — HYDROCORTISONE 10 MG/G
1 CREAM TOPICAL
Qty: 1 | Refills: 0 | Status: DISCONTINUED | COMMUNITY
Start: 2022-03-10 | End: 2024-04-02

## 2024-04-02 RX ORDER — NICOTINE POLACRILEX 4 MG/1
4 GUM, CHEWING BUCCAL
Qty: 1 | Refills: 1 | Status: DISCONTINUED | COMMUNITY
Start: 2021-09-29 | End: 2024-04-02

## 2024-04-02 RX ORDER — LEVONORGESTREL 52 MG/1
20 INTRAUTERINE DEVICE INTRAUTERINE
Qty: 1 | Refills: 0 | Status: DISCONTINUED | COMMUNITY
Start: 2022-03-07 | End: 2024-04-02

## 2024-04-02 NOTE — HISTORY OF PRESENT ILLNESS
[de-identified] : 29 y/o F with Hx LORE, migraines, anxiety, current smoker, and stable hepatic focal nodular hyperplasia, presenting for f/u.  Seen in ED 2 weeks ago for strep throat, improved s/p abx. Anxiety not well controlled Now with frequent headaches after running out of meds  Social hx: current smoker. Social alcohol. No marijuana/illicit drugs. Works as  for [employer]. Lives at home with family, 1 son.

## 2024-04-02 NOTE — ASSESSMENT
[FreeTextEntry1] : .  29 y/o F with Hx LORE, migraines, anxiety, current smoker, and stable hepatic focal nodular hyperplasia, presenting for f/u. HPI as above.  # Strep throat; resolved  # LORE - recent CBC wnl  # migraines - seen by neuro in past, refilled nortriptyline - neuro f/u  # anxiety - mostly situational as per pt, but causes panic attacks - psych and therapy referral  # Current smoker - had bad rx to patch in the past, does not want to discuss cessation further today  # hepatic focal nodular hyperplasia; stable on repeat imaging  # HCM - recent labs reviewed, f/u labs - GYN referral; due for pap  f/u pending above w/u

## 2024-05-02 ENCOUNTER — APPOINTMENT (OUTPATIENT)
Dept: INTERNAL MEDICINE | Facility: CLINIC | Age: 29
End: 2024-05-02
Payer: COMMERCIAL

## 2024-05-02 VITALS
TEMPERATURE: 99.2 F | RESPIRATION RATE: 16 BRPM | OXYGEN SATURATION: 99 % | DIASTOLIC BLOOD PRESSURE: 74 MMHG | WEIGHT: 152 LBS | HEART RATE: 82 BPM | SYSTOLIC BLOOD PRESSURE: 109 MMHG | BODY MASS INDEX: 25.95 KG/M2 | HEIGHT: 64 IN

## 2024-05-02 DIAGNOSIS — F41.9 ANXIETY DISORDER, UNSPECIFIED: ICD-10-CM

## 2024-05-02 PROCEDURE — G2211 COMPLEX E/M VISIT ADD ON: CPT

## 2024-05-02 PROCEDURE — 99214 OFFICE O/P EST MOD 30 MIN: CPT

## 2024-05-02 NOTE — ASSESSMENT
[FreeTextEntry1] : .  30 y/o F with Hx LORE, migraines, anxiety, depression, current smoker, and stable hepatic focal nodular hyperplasia, presenting for f/u. HPI as above.  # LORE - recent CBC wnl  # migraines - seen by neuro in past, c/w nortriptyline 10mg po daily - neuro f/u  # anxiety # depression - psych and therapy referral  # Current smoker - had bad rx to patch in the past, does not want to discuss cessation further today  # hepatic focal nodular hyperplasia; stable on repeat imaging  # HCM - recent labs reviewed, f/u labs - GYN referral; due for pap  f/u pending above w/u.

## 2024-05-02 NOTE — HEALTH RISK ASSESSMENT
[1] : 1) Little interest or pleasure doing things for several days (1) [2] : 2) Feeling down, depressed, or hopeless for more than half of the days (2) [PHQ-2 Positive] : PHQ-2 Positive [1/2 of Days or More (2)] : 3.) Trouble falling asleep, or sleeping too much? Half the days or more [Nearly Every Day (3)] : 4.) Feeling tired or having little energy? Nearly every day [Several Days (1)] : 6.) Feeling bad about yourself, or that you are a failure, or have let yourself or your family down? Several days [Not at All (0)] : 9.) Thoughts that you would be off dead or of hurting yourself in some way? Not at all [Mild] : Severity of Depression is Mild [[id number]] : 3 [[id number]] : 9 [Current] : Current [5-9] : 5-9

## 2024-05-02 NOTE — HISTORY OF PRESENT ILLNESS
[de-identified] : 30 y/o F with Hx LORE, migraines, anxiety, depression, current smoker, and stable hepatic focal nodular hyperplasia, presenting for f/u. Anxiety not well controlled, no recent anxiety attacks. Headaches improved after restarting nortriptyline, neuro eval scheduled.

## 2024-05-02 NOTE — PHYSICAL EXAM
[No Acute Distress] : no acute distress [Well-Appearing] : well-appearing [No Respiratory Distress] : no respiratory distress  [Coordination Grossly Intact] : coordination grossly intact [Normal Affect] : the affect was normal [Normal Insight/Judgement] : insight and judgment were intact

## 2024-05-07 ENCOUNTER — OUTPATIENT (OUTPATIENT)
Dept: OUTPATIENT SERVICES | Facility: HOSPITAL | Age: 29
LOS: 1 days | End: 2024-05-07
Payer: MEDICAID

## 2024-05-07 ENCOUNTER — APPOINTMENT (OUTPATIENT)
Dept: OBGYN | Facility: CLINIC | Age: 29
End: 2024-05-07
Payer: COMMERCIAL

## 2024-05-07 VITALS
WEIGHT: 153 LBS | BODY MASS INDEX: 26.12 KG/M2 | SYSTOLIC BLOOD PRESSURE: 118 MMHG | TEMPERATURE: 97.3 F | RESPIRATION RATE: 18 BRPM | HEART RATE: 77 BPM | HEIGHT: 64 IN | DIASTOLIC BLOOD PRESSURE: 77 MMHG

## 2024-05-07 DIAGNOSIS — Z87.19 PERSONAL HISTORY OF OTHER DISEASES OF THE DIGESTIVE SYSTEM: ICD-10-CM

## 2024-05-07 DIAGNOSIS — Z23 ENCOUNTER FOR IMMUNIZATION: ICD-10-CM

## 2024-05-07 DIAGNOSIS — Z01.818 ENCOUNTER FOR OTHER PREPROCEDURAL EXAMINATION: ICD-10-CM

## 2024-05-07 DIAGNOSIS — Z11.59 ENCOUNTER FOR SCREENING FOR OTHER VIRAL DISEASES: ICD-10-CM

## 2024-05-07 DIAGNOSIS — Z12.4 ENCOUNTER FOR SCREENING FOR MALIGNANT NEOPLASM OF CERVIX: ICD-10-CM

## 2024-05-07 DIAGNOSIS — Z31.69 ENCOUNTER FOR OTHER GENERAL COUNSELING AND ADVICE ON PROCREATION: ICD-10-CM

## 2024-05-07 DIAGNOSIS — R73.03 PREDIABETES.: ICD-10-CM

## 2024-05-07 DIAGNOSIS — E78.1 PURE HYPERGLYCERIDEMIA: ICD-10-CM

## 2024-05-07 DIAGNOSIS — Z30.09 ENCOUNTER FOR OTHER GENERAL COUNSELING AND ADVICE ON CONTRACEPTION: ICD-10-CM

## 2024-05-07 DIAGNOSIS — Z12.39 ENCOUNTER FOR OTHER SCREENING FOR MALIGNANT NEOPLASM OF BREAST: ICD-10-CM

## 2024-05-07 DIAGNOSIS — T83.89XA OTHER SPECIFIED COMPLICATION OF GENITOURINARY PROSTHETIC DEVICES, IMPLANTS AND GRAFTS, INITIAL ENCOUNTER: ICD-10-CM

## 2024-05-07 DIAGNOSIS — R16.0 HEPATOMEGALY, NOT ELSEWHERE CLASSIFIED: ICD-10-CM

## 2024-05-07 DIAGNOSIS — G43.909 MIGRAINE, UNSPECIFIED, NOT INTRACTABLE, W/OUT STATUS MIGRAINOSUS: ICD-10-CM

## 2024-05-07 DIAGNOSIS — R59.0 LOCALIZED ENLARGED LYMPH NODES: ICD-10-CM

## 2024-05-07 DIAGNOSIS — N93.0 POSTCOITAL AND CONTACT BLEEDING: ICD-10-CM

## 2024-05-07 DIAGNOSIS — K43.9 VENTRAL HERNIA W/OUT OBSTRUCTION OR GANGRENE: ICD-10-CM

## 2024-05-07 DIAGNOSIS — Z78.9 OTHER SPECIFIED HEALTH STATUS: ICD-10-CM

## 2024-05-07 DIAGNOSIS — D64.9 ANEMIA, UNSPECIFIED: ICD-10-CM

## 2024-05-07 DIAGNOSIS — Z11.3 ENCOUNTER FOR SCREENING FOR INFECTIONS WITH A PREDOMINANTLY SEXUAL MODE OF TRANSMISSION: ICD-10-CM

## 2024-05-07 DIAGNOSIS — G43.009 MIGRAINE W/OUT AURA, NOT INTRACTABLE, W/OUT STATUS MIGRAINOSUS: ICD-10-CM

## 2024-05-07 DIAGNOSIS — Z02.1 ENCOUNTER FOR PRE-EMPLOYMENT EXAMINATION: ICD-10-CM

## 2024-05-07 DIAGNOSIS — Z00.00 ENCOUNTER FOR GENERAL ADULT MEDICAL EXAMINATION WITHOUT ABNORMAL FINDINGS: ICD-10-CM

## 2024-05-07 DIAGNOSIS — Z87.42 PERSONAL HISTORY OF OTHER DISEASES OF THE FEMALE GENITAL TRACT: ICD-10-CM

## 2024-05-07 DIAGNOSIS — N97.9 FEMALE INFERTILITY, UNSPECIFIED: ICD-10-CM

## 2024-05-07 DIAGNOSIS — N92.0 OTHER SPECIFIED COMPLICATION OF GENITOURINARY PROSTHETIC DEVICES, IMPLANTS AND GRAFTS, INITIAL ENCOUNTER: ICD-10-CM

## 2024-05-07 DIAGNOSIS — K62.5 HEMORRHAGE OF ANUS AND RECTUM: ICD-10-CM

## 2024-05-07 DIAGNOSIS — Z87.898 PERSONAL HISTORY OF OTHER SPECIFIED CONDITIONS: ICD-10-CM

## 2024-05-07 DIAGNOSIS — R41.840 ATTENTION AND CONCENTRATION DEFICIT: ICD-10-CM

## 2024-05-07 DIAGNOSIS — Z86.2 PERSONAL HISTORY OF DISEASES OF THE BLOOD AND BLOOD-FORMING ORGANS AND CERTAIN DISORDERS INVOLVING THE IMMUNE MECHANISM: ICD-10-CM

## 2024-05-07 DIAGNOSIS — R51.9 HEADACHE, UNSPECIFIED: ICD-10-CM

## 2024-05-07 DIAGNOSIS — K76.89 OTHER SPECIFIED DISEASES OF LIVER: ICD-10-CM

## 2024-05-07 DIAGNOSIS — R21 RASH AND OTHER NONSPECIFIC SKIN ERUPTION: ICD-10-CM

## 2024-05-07 DIAGNOSIS — Z01.419 ENCOUNTER FOR GYNECOLOGICAL EXAMINATION (GENERAL) (ROUTINE) W/OUT ABNORMAL FINDINGS: ICD-10-CM

## 2024-05-07 DIAGNOSIS — Z87.2 PERSONAL HISTORY OF DISEASES OF THE SKIN AND SUBCUTANEOUS TISSUE: ICD-10-CM

## 2024-05-07 DIAGNOSIS — Z86.39 PERSONAL HISTORY OF OTHER ENDOCRINE, NUTRITIONAL AND METABOLIC DISEASE: ICD-10-CM

## 2024-05-07 PROCEDURE — 87491 CHLMYD TRACH DNA AMP PROBE: CPT

## 2024-05-07 PROCEDURE — 99214 OFFICE O/P EST MOD 30 MIN: CPT

## 2024-05-07 PROCEDURE — 87591 N.GONORRHOEAE DNA AMP PROB: CPT

## 2024-05-07 PROCEDURE — 83001 ASSAY OF GONADOTROPIN (FSH): CPT

## 2024-05-07 PROCEDURE — 83002 ASSAY OF GONADOTROPIN (LH): CPT

## 2024-05-07 PROCEDURE — 36415 COLL VENOUS BLD VENIPUNCTURE: CPT

## 2024-05-07 PROCEDURE — 85025 COMPLETE CBC W/AUTO DIFF WBC: CPT

## 2024-05-07 PROCEDURE — 83036 HEMOGLOBIN GLYCOSYLATED A1C: CPT

## 2024-05-07 PROCEDURE — 84146 ASSAY OF PROLACTIN: CPT

## 2024-05-07 PROCEDURE — G0463: CPT

## 2024-05-07 PROCEDURE — 84439 ASSAY OF FREE THYROXINE: CPT

## 2024-05-07 PROCEDURE — 84443 ASSAY THYROID STIM HORMONE: CPT

## 2024-05-07 PROCEDURE — 87624 HPV HI-RISK TYP POOLED RSLT: CPT

## 2024-05-07 NOTE — PHYSICAL EXAM
[Chaperone Present] : A chaperone was present in the examining room during all aspects of the physical examination [[id number]] : A chaperone was present during the pelvic exam. [Appropriately responsive] : appropriately responsive [Alert] : alert [No Acute Distress] : no acute distress [No Lymphadenopathy] : no lymphadenopathy [Mass] : mass [Soft] : soft [Non-tender] : non-tender [Non-distended] : non-distended [No HSM] : No HSM [No Lesions] : no lesions [No Mass] : no mass [Oriented x3] : oriented x3 [Examination Of The Breasts] : a normal appearance [No Masses] : no breast masses were palpable [Labia Majora] : normal [Labia Minora] : normal [Scant] : There was scant vaginal bleeding [Normal] : normal

## 2024-05-07 NOTE — HISTORY OF PRESENT ILLNESS
[FreeTextEntry1] : Annual gyn exam   LMP(24) - Menarche 14 / q 28 days / 5-6 days   Last Pap () Negative   / s/p  x 1 ()   Sexually active with , FOB, no use of contraception, hasn't' conceived since birth of child.  Was using IUD, removed (), since then has not used any protection and has not conceived, pt would like to have another child in the future.   Employed with [employer], .  [Normal Amount/Duration] :  normal amount and duration [Regular Cycle Intervals] : periods have been regular [Frequency: Q ___ days] : menstrual periods occur approximately every [unfilled] days [Menarche Age: ____] : age at menarche was [unfilled] [No] : Patient does not have concerns regarding sex [Currently Active] : currently active [Men] : men [Vaginal] : vaginal

## 2024-05-07 NOTE — PLAN
[FreeTextEntry1] : Annual gyn exam - Pap/HPV/STD sent; Hx/o secondary in fertility - Referral for Pelvic US / Blood tests ordered today; Encouraged consultation with JASMINA if results are WNL.    -I spent a total of 50 minutes on date of the encounter reviewing patient's medical / surgical and Ob/Gyn history, evaluating, counseling and treating the patient.

## 2024-05-08 DIAGNOSIS — Z11.3 ENCOUNTER FOR SCREENING FOR INFECTIONS WITH A PREDOMINANTLY SEXUAL MODE OF TRANSMISSION: ICD-10-CM

## 2024-05-08 DIAGNOSIS — Z12.4 ENCOUNTER FOR SCREENING FOR MALIGNANT NEOPLASM OF CERVIX: ICD-10-CM

## 2024-05-08 DIAGNOSIS — Z31.69 ENCOUNTER FOR OTHER GENERAL COUNSELING AND ADVICE ON PROCREATION: ICD-10-CM

## 2024-05-08 DIAGNOSIS — Z30.09 ENCOUNTER FOR OTHER GENERAL COUNSELING AND ADVICE ON CONTRACEPTION: ICD-10-CM

## 2024-05-08 DIAGNOSIS — Z01.419 ENCOUNTER FOR GYNECOLOGICAL EXAMINATION (GENERAL) (ROUTINE) WITHOUT ABNORMAL FINDINGS: ICD-10-CM

## 2024-05-08 DIAGNOSIS — Z12.39 ENCOUNTER FOR OTHER SCREENING FOR MALIGNANT NEOPLASM OF BREAST: ICD-10-CM

## 2024-05-08 DIAGNOSIS — N97.9 FEMALE INFERTILITY, UNSPECIFIED: ICD-10-CM

## 2024-05-08 LAB
BASOPHILS # BLD AUTO: 0.04 K/UL
BASOPHILS NFR BLD AUTO: 0.6 %
EOSINOPHIL # BLD AUTO: 0.17 K/UL
EOSINOPHIL NFR BLD AUTO: 2.4 %
ESTIMATED AVERAGE GLUCOSE: 108 MG/DL
FSH SERPL-MCNC: 6.8 IU/L
HBA1C MFR BLD HPLC: 5.4 %
HCT VFR BLD CALC: 42.9 %
HGB BLD-MCNC: 13.1 G/DL
IMM GRANULOCYTES NFR BLD AUTO: 0.1 %
LH SERPL-ACNC: 5.9 IU/L
LYMPHOCYTES # BLD AUTO: 2.58 K/UL
LYMPHOCYTES NFR BLD AUTO: 36.2 %
MAN DIFF?: NORMAL
MCHC RBC-ENTMCNC: 29.1 PG
MCHC RBC-ENTMCNC: 30.5 GM/DL
MCV RBC AUTO: 95.3 FL
MONOCYTES # BLD AUTO: 0.68 K/UL
MONOCYTES NFR BLD AUTO: 9.5 %
NEUTROPHILS # BLD AUTO: 3.65 K/UL
NEUTROPHILS NFR BLD AUTO: 51.2 %
PLATELET # BLD AUTO: 261 K/UL
PROLACTIN SERPL-MCNC: 12 NG/ML
RBC # BLD: 4.5 M/UL
RBC # FLD: 12.6 %
T4 FREE SERPL-MCNC: 1.2 NG/DL
TSH SERPL-ACNC: 1 UIU/ML
WBC # FLD AUTO: 7.13 K/UL

## 2024-05-13 LAB — CYTOLOGY CVX/VAG DOC THIN PREP: NORMAL

## 2024-05-14 ENCOUNTER — APPOINTMENT (OUTPATIENT)
Dept: ULTRASOUND IMAGING | Facility: CLINIC | Age: 29
End: 2024-05-14
Payer: COMMERCIAL

## 2024-05-14 DIAGNOSIS — E55.9 VITAMIN D DEFICIENCY, UNSPECIFIED: ICD-10-CM

## 2024-05-14 LAB
25(OH)D3 SERPL-MCNC: 15.4 NG/ML
BASOPHILS # BLD AUTO: 0.04 K/UL
BASOPHILS NFR BLD AUTO: 0.5 %
CHOLEST SERPL-MCNC: 206 MG/DL
EOSINOPHIL # BLD AUTO: 0.31 K/UL
EOSINOPHIL NFR BLD AUTO: 3.9 %
ESTIMATED AVERAGE GLUCOSE: 117 MG/DL
HBA1C MFR BLD HPLC: 5.7 %
HCT VFR BLD CALC: 41.7 %
HDLC SERPL-MCNC: 47 MG/DL
HGB BLD-MCNC: 12.9 G/DL
IMM GRANULOCYTES NFR BLD AUTO: 0.3 %
LDLC SERPL CALC-MCNC: 141 MG/DL
LYMPHOCYTES # BLD AUTO: 1.73 K/UL
LYMPHOCYTES NFR BLD AUTO: 21.8 %
MAN DIFF?: NORMAL
MCHC RBC-ENTMCNC: 29.1 PG
MCHC RBC-ENTMCNC: 30.9 GM/DL
MCV RBC AUTO: 93.9 FL
MONOCYTES # BLD AUTO: 0.89 K/UL
MONOCYTES NFR BLD AUTO: 11.2 %
NEUTROPHILS # BLD AUTO: 4.94 K/UL
NEUTROPHILS NFR BLD AUTO: 62.3 %
NONHDLC SERPL-MCNC: 158 MG/DL
PLATELET # BLD AUTO: 217 K/UL
RBC # BLD: 4.44 M/UL
RBC # FLD: 12.9 %
TRIGL SERPL-MCNC: 94 MG/DL
TSH SERPL-ACNC: 0.97 UIU/ML
WBC # FLD AUTO: 7.93 K/UL

## 2024-05-14 PROCEDURE — 76856 US EXAM PELVIC COMPLETE: CPT

## 2024-05-14 PROCEDURE — 76830 TRANSVAGINAL US NON-OB: CPT

## 2024-05-14 RX ORDER — ERGOCALCIFEROL 1.25 MG/1
1.25 MG CAPSULE, LIQUID FILLED ORAL
Qty: 8 | Refills: 0 | Status: ACTIVE | COMMUNITY
Start: 2024-05-14 | End: 1900-01-01

## 2024-05-14 RX ORDER — MULTIVIT-MIN/FOLIC/VIT K/LYCOP 400-300MCG
25 MCG TABLET ORAL DAILY
Qty: 90 | Refills: 1 | Status: ACTIVE | COMMUNITY
Start: 2024-05-14 | End: 1900-01-01

## 2024-07-31 ENCOUNTER — NON-APPOINTMENT (OUTPATIENT)
Age: 29
End: 2024-07-31

## 2024-08-07 ENCOUNTER — APPOINTMENT (OUTPATIENT)
Dept: INTERNAL MEDICINE | Facility: CLINIC | Age: 29
End: 2024-08-07

## 2024-08-07 PROBLEM — S09.90XA HEAD TRAUMA: Status: ACTIVE | Noted: 2024-08-07

## 2024-08-07 PROBLEM — E78.5 HYPERLIPIDEMIA, UNSPECIFIED HYPERLIPIDEMIA TYPE: Status: ACTIVE | Noted: 2021-06-05

## 2024-08-07 PROBLEM — R73.03 PREDIABETES: Status: ACTIVE | Noted: 2023-05-16

## 2024-08-07 PROCEDURE — G2211 COMPLEX E/M VISIT ADD ON: CPT

## 2024-08-07 PROCEDURE — 99214 OFFICE O/P EST MOD 30 MIN: CPT

## 2024-08-07 NOTE — REVIEW OF SYSTEMS
[Negative] : Heme/Lymph [FreeTextEntry9] :  as per HPI  [de-identified] :  as per HPI  [de-identified] :  as per HPI

## 2024-08-07 NOTE — ASSESSMENT
[FreeTextEntry1] : . 30 y/o F with Hx LORE, migraines, anxiety, depression, current smoker, and stable hepatic focal nodular hyperplasia, presenting for f/u. HPI as above.  # Head trauma - f/u CT  # Back/neck pain; suspect muscle spasm - c/w NSAID and muscle relaxant - trial hot bath, topical pain relief, massage  # LORE - recent CBCs wnl  # migraines - seen by neuro in past, c/w nortriptyline 10mg po daily - neuro f/u scheduled next week  # anxiety # depression - psych and therapy referrals given again  # Current smoker - had bad rx to patch in the past, does not want to discuss cessation further today  # hepatic focal nodular hyperplasia; stable on repeat imaging  # HCM - recent labs reviewed - 5/2024 pap wnl  f/u pending clinical course

## 2024-08-07 NOTE — HISTORY OF PRESENT ILLNESS
[de-identified] : 28 y/o F with Hx LORE, HLD, pre-DM, vit D deficiency, migraines, anxiety, depression, current smoker, and stable hepatic focal nodular hyperplasia, presenting for f/u. Anxiety/depression not well controlled. Headaches stable on nortriptyline, still intermittent, neuro eval scheduled. + accident at work on 7/31 when mailbox fell on L posterior head, seen at [place] ED given muscle relaxant and ibuprofen. Now with lower mid back pain since accident. Headaches worse/different since accident. No vision/heariong changes.

## 2024-08-07 NOTE — PHYSICAL EXAM
[No Acute Distress] : no acute distress [Well-Appearing] : well-appearing [No Respiratory Distress] : no respiratory distress  [No Accessory Muscle Use] : no accessory muscle use [Clear to Auscultation] : lungs were clear to auscultation bilaterally [Normal Rate] : normal rate  [Regular Rhythm] : with a regular rhythm [Coordination Grossly Intact] : coordination grossly intact [Normal Affect] : the affect was normal [Normal Insight/Judgement] : insight and judgment were intact [de-identified] : L posterior skull non-tender

## 2024-08-12 ENCOUNTER — NON-APPOINTMENT (OUTPATIENT)
Age: 29
End: 2024-08-12

## 2024-08-13 ENCOUNTER — APPOINTMENT (OUTPATIENT)
Dept: NEUROLOGY | Facility: CLINIC | Age: 29
End: 2024-08-13

## 2024-08-14 ENCOUNTER — NON-APPOINTMENT (OUTPATIENT)
Age: 29
End: 2024-08-14

## 2024-08-15 ENCOUNTER — APPOINTMENT (OUTPATIENT)
Dept: CT IMAGING | Facility: CLINIC | Age: 29
End: 2024-08-15
Payer: COMMERCIAL

## 2024-08-15 PROCEDURE — 70450 CT HEAD/BRAIN W/O DYE: CPT

## 2024-08-16 ENCOUNTER — APPOINTMENT (OUTPATIENT)
Dept: NEUROLOGY | Facility: CLINIC | Age: 29
End: 2024-08-16
Payer: COMMERCIAL

## 2024-08-16 VITALS
WEIGHT: 155 LBS | SYSTOLIC BLOOD PRESSURE: 105 MMHG | DIASTOLIC BLOOD PRESSURE: 72 MMHG | BODY MASS INDEX: 26.61 KG/M2 | HEART RATE: 91 BPM | TEMPERATURE: 97.6 F | OXYGEN SATURATION: 99 %

## 2024-08-16 DIAGNOSIS — G44.229 CHRONIC TENSION-TYPE HEADACHE, NOT INTRACTABLE: ICD-10-CM

## 2024-08-16 DIAGNOSIS — S06.0X0A CONCUSSION W/OUT LOSS OF CONSCIOUSNESS, INITIAL ENCOUNTER: ICD-10-CM

## 2024-08-16 DIAGNOSIS — G43.109 MIGRAINE WITH AURA, NOT INTRACTABLE, W/OUT STATUS MIGRAINOSUS: ICD-10-CM

## 2024-08-16 PROCEDURE — G2211 COMPLEX E/M VISIT ADD ON: CPT

## 2024-08-16 PROCEDURE — 99215 OFFICE O/P EST HI 40 MIN: CPT

## 2024-08-16 RX ORDER — AMITRIPTYLINE HYDROCHLORIDE 25 MG/1
25 TABLET, FILM COATED ORAL
Qty: 30 | Refills: 5 | Status: ACTIVE | COMMUNITY
Start: 2024-08-16 | End: 1900-01-01

## 2024-08-16 NOTE — PHYSICAL EXAM
[General Appearance - Alert] : alert [General Appearance - In No Acute Distress] : in no acute distress [Person] : oriented to person [Place] : oriented to place [Time] : oriented to time [Concentration Intact] : normal concentrating ability [Naming Objects] : no difficulty naming common objects [Fluency] : fluency intact [Comprehension] : comprehension intact [Vocabulary] : adequate range of vocabulary [Cranial Nerves Optic (II)] : visual acuity intact bilaterally,  visual fields full to confrontation, pupils equal round and reactive to light [Cranial Nerves Oculomotor (III)] : extraocular motion intact [Cranial Nerves Trigeminal (V)] : facial sensation intact symmetrically [Cranial Nerves Facial (VII)] : face symmetrical [Cranial Nerves Vestibulocochlear (VIII)] : hearing was intact bilaterally [Cranial Nerves Glossopharyngeal (IX)] : tongue and palate midline [Cranial Nerves Accessory (XI - Cranial And Spinal)] : head turning and shoulder shrug symmetric [Cranial Nerves Hypoglossal (XII)] : there was no tongue deviation with protrusion [Motor Tone] : muscle tone was normal in all four extremities [Motor Strength] : muscle strength was normal in all four extremities [Involuntary Movements] : no involuntary movements were seen [Sensation Tactile Decrease] : light touch was intact [Abnormal Walk] : normal gait [Balance] : balance was intact [Coordination - Dysmetria Impaired Finger-to-Nose Bilateral] : present bilaterally [Coordination - Dysmetria Impaired Heel-to-Shin Bilateral] : present bilaterally

## 2024-08-16 NOTE — HISTORY OF PRESENT ILLNESS
[FreeTextEntry1] : The patient is here for evaluation of headaches which started as a teenager. The headache started in the neck and radiate to the bilateral frontotemporal regions and involved the vertex and then becomes holocephalic, feels like throbbing pain. The pain is severe. It occurs daily. There is no photophobia or nausea vomiting. The patient feels sonophobia. There is no aura. There is no one-sided weakness numbness or tingling with the headaches. The headaches are worse as the day progresses. The patient takes Tylenol without any significant relief of the pain. She was given amitriptyline by her primary physician which she takes as needed at times, goes as well as not taking it for a week. She was also given riboflavin 100 mg daily which she has not taken. The patient was started on Nortriptyline 10mg HS, she continues to have headaches, no SE to the med.  The patient has an IUD, nonhormone eluding 1.  There is family history of headaches, patient's sister has headaches. Patient's mother has an aneurysm, unclear if first.  Patient has head trauma w/o LOC, mailbox fell and hit her head, end of July 2024, has been having blurry vision occational since then, w/o worsening headaches, sleepiness, sz, change of behavior/ sleep.   No difficulty with language.  No vision loss or double vision.  No dizziness, vertigo or new hearing loss.  No difficulty with speech or swallowing.  No focal weakness.  No focal sensory changes.  No numbness or tingling in the bilateral lower extremities.  No difficulty with balance.  No difficulty with ambulation.

## 2024-08-16 NOTE — ASSESSMENT
[FreeTextEntry1] : Patient has migraine with aura which persist on chronic daily basis. She had unremarkable an MRI and MRV MR ray of the brain. The patient was started on amitriptyline. She takes it as needed.  Stopped amitriptyline and increase nortriptyline from 10 to 25 mg at bedtime for headache prophylaxis. Patient can take Tylenol as needed at onset of headache and repeat in 2 hours if the headache continues. She should use Tylenol as rescue and limited to maximum of 3 days/week. We will hold off riboflavin at this time. Discussed other none medication options with the patient including acupuncture and Botox.  Concussion w/o LOC end of July 2024, normal CTH has blurry vision advised patient on importance of supportive care and prognosis at this time  I spent the time noted on the day of this patient encounter preparing for, providing and documenting the above E/M service and counseling and educate patient on differential, workup, disease course, and treatment/management. Education was provided to the patient during this encounter. All questions and concerns were answered and addressed in detail. The patient verbalized understanding and agreed to plan. Patient was advised to continue to monitor for neurologic symptoms and to notify my office or go to the nearest emergency room if there are any changes. Any orders/referrals and communications were provided as well.  Side effects of the above medications were discussed in detail including but not limited to applicable black box warning and teratogenicity as appropriate.  Patient was advised to bring previous records to my office, including CD of imaging, when applicable.

## 2024-08-19 DIAGNOSIS — M25.519 PAIN IN UNSPECIFIED SHOULDER: ICD-10-CM

## 2024-08-22 ENCOUNTER — APPOINTMENT (OUTPATIENT)
Dept: INTERNAL MEDICINE | Facility: CLINIC | Age: 29
End: 2024-08-22

## 2024-11-18 ENCOUNTER — APPOINTMENT (OUTPATIENT)
Dept: INTERNAL MEDICINE | Facility: CLINIC | Age: 29
End: 2024-11-18
Payer: MEDICAID

## 2024-11-18 VITALS
OXYGEN SATURATION: 99 % | BODY MASS INDEX: 26.63 KG/M2 | SYSTOLIC BLOOD PRESSURE: 116 MMHG | TEMPERATURE: 98.4 F | WEIGHT: 156 LBS | HEIGHT: 64 IN | DIASTOLIC BLOOD PRESSURE: 78 MMHG | HEART RATE: 94 BPM

## 2024-11-18 DIAGNOSIS — S09.90XA UNSPECIFIED INJURY OF HEAD, INITIAL ENCOUNTER: ICD-10-CM

## 2024-11-18 DIAGNOSIS — S06.0X0A CONCUSSION W/OUT LOSS OF CONSCIOUSNESS, INITIAL ENCOUNTER: ICD-10-CM

## 2024-11-18 DIAGNOSIS — E78.5 HYPERLIPIDEMIA, UNSPECIFIED: ICD-10-CM

## 2024-11-18 DIAGNOSIS — E55.9 VITAMIN D DEFICIENCY, UNSPECIFIED: ICD-10-CM

## 2024-11-18 PROCEDURE — G2211 COMPLEX E/M VISIT ADD ON: CPT | Mod: NC

## 2024-11-18 PROCEDURE — 99214 OFFICE O/P EST MOD 30 MIN: CPT

## 2024-12-16 ENCOUNTER — APPOINTMENT (OUTPATIENT)
Dept: NEUROLOGY | Facility: CLINIC | Age: 29
End: 2024-12-16

## 2025-04-02 ENCOUNTER — NON-APPOINTMENT (OUTPATIENT)
Age: 30
End: 2025-04-02

## 2025-05-19 ENCOUNTER — APPOINTMENT (OUTPATIENT)
Dept: INTERNAL MEDICINE | Facility: CLINIC | Age: 30
End: 2025-05-19
Payer: MEDICAID

## 2025-05-19 DIAGNOSIS — E78.5 HYPERLIPIDEMIA, UNSPECIFIED: ICD-10-CM

## 2025-05-19 PROCEDURE — 99212 OFFICE O/P EST SF 10 MIN: CPT | Mod: 95

## 2025-05-19 PROCEDURE — G2211 COMPLEX E/M VISIT ADD ON: CPT | Mod: NC,95

## 2025-06-04 ENCOUNTER — OUTPATIENT (OUTPATIENT)
Dept: INPATIENT UNIT | Facility: HOSPITAL | Age: 30
LOS: 1 days | End: 2025-06-04
Payer: MEDICAID

## 2025-06-04 ENCOUNTER — APPOINTMENT (OUTPATIENT)
Dept: ANTEPARTUM | Facility: CLINIC | Age: 30
End: 2025-06-04
Payer: MEDICAID

## 2025-06-04 ENCOUNTER — ASOB RESULT (OUTPATIENT)
Age: 30
End: 2025-06-04

## 2025-06-04 ENCOUNTER — EMERGENCY (EMERGENCY)
Facility: HOSPITAL | Age: 30
LOS: 0 days | Discharge: HOME | End: 2025-06-04
Attending: STUDENT IN AN ORGANIZED HEALTH CARE EDUCATION/TRAINING PROGRAM

## 2025-06-04 VITALS — SYSTOLIC BLOOD PRESSURE: 102 MMHG | DIASTOLIC BLOOD PRESSURE: 63 MMHG | HEART RATE: 82 BPM

## 2025-06-04 VITALS — SYSTOLIC BLOOD PRESSURE: 120 MMHG | HEART RATE: 83 BPM | OXYGEN SATURATION: 100 % | DIASTOLIC BLOOD PRESSURE: 68 MMHG

## 2025-06-04 VITALS
TEMPERATURE: 98 F | SYSTOLIC BLOOD PRESSURE: 128 MMHG | WEIGHT: 160.06 LBS | HEART RATE: 90 BPM | HEIGHT: 64 IN | DIASTOLIC BLOOD PRESSURE: 85 MMHG | OXYGEN SATURATION: 100 % | RESPIRATION RATE: 19 BRPM

## 2025-06-04 VITALS
HEART RATE: 91 BPM | OXYGEN SATURATION: 100 % | DIASTOLIC BLOOD PRESSURE: 78 MMHG | RESPIRATION RATE: 20 BRPM | SYSTOLIC BLOOD PRESSURE: 124 MMHG

## 2025-06-04 DIAGNOSIS — O42.00 PREMATURE RUPTURE OF MEMBRANES, ONSET OF LABOR WITHIN 24 HOURS OF RUPTURE, UNSPECIFIED WEEKS OF GESTATION: ICD-10-CM

## 2025-06-04 DIAGNOSIS — Z98.890 OTHER SPECIFIED POSTPROCEDURAL STATES: Chronic | ICD-10-CM

## 2025-06-04 LAB
ALBUMIN SERPL ELPH-MCNC: 2.9 G/DL — LOW (ref 3.3–5)
ALP SERPL-CCNC: 49 U/L — SIGNIFICANT CHANGE UP (ref 40–120)
ALT FLD-CCNC: 13 U/L — SIGNIFICANT CHANGE UP (ref 12–78)
ANION GAP SERPL CALC-SCNC: 5 MMOL/L — SIGNIFICANT CHANGE UP (ref 5–17)
APPEARANCE UR: ABNORMAL
APTT BLD: 28.6 SEC — SIGNIFICANT CHANGE UP (ref 26.1–36.8)
AST SERPL-CCNC: 10 U/L — LOW (ref 15–37)
BACTERIA # UR AUTO: ABNORMAL /HPF
BASOPHILS # BLD AUTO: 0.02 K/UL — SIGNIFICANT CHANGE UP (ref 0–0.2)
BASOPHILS NFR BLD AUTO: 0.3 % — SIGNIFICANT CHANGE UP (ref 0–2)
BILIRUB SERPL-MCNC: 0.4 MG/DL — SIGNIFICANT CHANGE UP (ref 0.2–1.2)
BILIRUB UR-MCNC: NEGATIVE — SIGNIFICANT CHANGE UP
BLD GP AB SCN SERPL QL: SIGNIFICANT CHANGE UP
BUN SERPL-MCNC: 2 MG/DL — LOW (ref 7–23)
CALCIUM SERPL-MCNC: 8.8 MG/DL — SIGNIFICANT CHANGE UP (ref 8.5–10.1)
CAST: 2 /LPF — SIGNIFICANT CHANGE UP (ref 0–4)
CHLORIDE SERPL-SCNC: 109 MMOL/L — HIGH (ref 96–108)
CO2 SERPL-SCNC: 22 MMOL/L — SIGNIFICANT CHANGE UP (ref 22–31)
COLOR SPEC: YELLOW — SIGNIFICANT CHANGE UP
CREAT SERPL-MCNC: 0.51 MG/DL — SIGNIFICANT CHANGE UP (ref 0.5–1.3)
DIFF PNL FLD: NEGATIVE — SIGNIFICANT CHANGE UP
EGFR: 127 ML/MIN/1.73M2 — SIGNIFICANT CHANGE UP
EGFR: 127 ML/MIN/1.73M2 — SIGNIFICANT CHANGE UP
EOSINOPHIL # BLD AUTO: 0.05 K/UL — SIGNIFICANT CHANGE UP (ref 0–0.5)
EOSINOPHIL NFR BLD AUTO: 0.8 % — SIGNIFICANT CHANGE UP (ref 0–6)
GLUCOSE SERPL-MCNC: 98 MG/DL — SIGNIFICANT CHANGE UP (ref 70–99)
GLUCOSE UR QL: NEGATIVE MG/DL — SIGNIFICANT CHANGE UP
HCT VFR BLD CALC: 34.7 % — SIGNIFICANT CHANGE UP (ref 34.5–45)
HGB BLD-MCNC: 11.4 G/DL — LOW (ref 11.5–15.5)
IMM GRANULOCYTES NFR BLD AUTO: 0.3 % — SIGNIFICANT CHANGE UP (ref 0–0.9)
INR BLD: 1.04 RATIO — SIGNIFICANT CHANGE UP (ref 0.85–1.16)
KETONES UR QL: NEGATIVE MG/DL — SIGNIFICANT CHANGE UP
LEUKOCYTE ESTERASE UR-ACNC: ABNORMAL
LYMPHOCYTES # BLD AUTO: 1.64 K/UL — SIGNIFICANT CHANGE UP (ref 1–3.3)
LYMPHOCYTES # BLD AUTO: 27.8 % — SIGNIFICANT CHANGE UP (ref 13–44)
MCHC RBC-ENTMCNC: 29.7 PG — SIGNIFICANT CHANGE UP (ref 27–34)
MCHC RBC-ENTMCNC: 32.9 G/DL — SIGNIFICANT CHANGE UP (ref 32–36)
MCV RBC AUTO: 90.4 FL — SIGNIFICANT CHANGE UP (ref 80–100)
MONOCYTES # BLD AUTO: 0.6 K/UL — SIGNIFICANT CHANGE UP (ref 0–0.9)
MONOCYTES NFR BLD AUTO: 10.2 % — SIGNIFICANT CHANGE UP (ref 2–14)
NEUTROPHILS # BLD AUTO: 3.57 K/UL — SIGNIFICANT CHANGE UP (ref 1.8–7.4)
NEUTROPHILS NFR BLD AUTO: 60.6 % — SIGNIFICANT CHANGE UP (ref 43–77)
NITRITE UR-MCNC: NEGATIVE — SIGNIFICANT CHANGE UP
NRBC BLD AUTO-RTO: 0 /100 WBCS — SIGNIFICANT CHANGE UP (ref 0–0)
PH UR: 7.5 — SIGNIFICANT CHANGE UP (ref 5–8)
PLATELET # BLD AUTO: 187 K/UL — SIGNIFICANT CHANGE UP (ref 150–400)
POTASSIUM SERPL-MCNC: 3.3 MMOL/L — LOW (ref 3.5–5.3)
POTASSIUM SERPL-SCNC: 3.3 MMOL/L — LOW (ref 3.5–5.3)
PROT SERPL-MCNC: 6.7 GM/DL — SIGNIFICANT CHANGE UP (ref 6–8.3)
PROT UR-MCNC: NEGATIVE MG/DL — SIGNIFICANT CHANGE UP
PROTHROM AB SERPL-ACNC: 11.7 SEC — SIGNIFICANT CHANGE UP (ref 9.9–13.4)
RBC # BLD: 3.84 M/UL — SIGNIFICANT CHANGE UP (ref 3.8–5.2)
RBC # FLD: 12.8 % — SIGNIFICANT CHANGE UP (ref 10.3–14.5)
RBC CASTS # UR COMP ASSIST: 0 /HPF — SIGNIFICANT CHANGE UP (ref 0–4)
REVIEW: SIGNIFICANT CHANGE UP
SODIUM SERPL-SCNC: 136 MMOL/L — SIGNIFICANT CHANGE UP (ref 135–145)
SP GR SPEC: 1.01 — SIGNIFICANT CHANGE UP (ref 1–1.03)
SQUAMOUS # UR AUTO: 16 /HPF — HIGH (ref 0–5)
UROBILINOGEN FLD QL: 0.2 MG/DL — SIGNIFICANT CHANGE UP (ref 0.2–1)
WBC # BLD: 5.9 K/UL — SIGNIFICANT CHANGE UP (ref 3.8–10.5)
WBC # FLD AUTO: 5.9 K/UL — SIGNIFICANT CHANGE UP (ref 3.8–10.5)
WBC UR QL: 10 /HPF — HIGH (ref 0–5)
YEAST-LIKE CELLS: PRESENT

## 2025-06-04 PROCEDURE — 76805 OB US >/= 14 WKS SNGL FETUS: CPT | Mod: 26

## 2025-06-04 PROCEDURE — 99221 1ST HOSP IP/OBS SF/LOW 40: CPT | Mod: GC

## 2025-06-04 PROCEDURE — 99285 EMERGENCY DEPT VISIT HI MDM: CPT

## 2025-06-04 RX ORDER — SODIUM CHLORIDE 9 G/1000ML
1000 INJECTION, SOLUTION INTRAVENOUS
Refills: 0 | Status: DISCONTINUED | OUTPATIENT
Start: 2025-06-04 | End: 2025-06-04

## 2025-06-04 RX ORDER — ACETAMINOPHEN 500 MG/5ML
1000 LIQUID (ML) ORAL ONCE
Refills: 0 | Status: COMPLETED | OUTPATIENT
Start: 2025-06-04 | End: 2025-06-04

## 2025-06-04 RX ADMIN — Medication 400 MILLIGRAM(S): at 13:25

## 2025-06-04 RX ADMIN — SODIUM CHLORIDE 125 MILLILITER(S): 9 INJECTION, SOLUTION INTRAVENOUS at 16:10

## 2025-06-04 RX ADMIN — Medication 1000 MILLILITER(S): at 13:25

## 2025-06-04 NOTE — OB RN TRIAGE NOTE - COMFORT/ACCEPTABLE PAIN LEVEL (0-10)
48y female PMHx DMII, STEMI w/recent admission sent in from outpt OBGYN office for low h/h. Pt reports heavy vaginal bleeding for approx 12 days, currently soaking through 4pads an hour, has changed once since arriving in the ED. reports bleeding has improved over the last week, clots have decreased in size. pt also reports generalized fatigue and dizziness with exertion. Denies abdominal pain, urinary symptoms, fever, chills, CP, SOB, or LOC  OBGYN Dr. Delarosa  ED course: H/H 6.4/21.4. TVUS showed "  Enlarged/bulky and heterogeneous uterus with asymmetric thickening of the anterior endometrium seen infiltrating the anterior myometrium which may represent changes related to adenomyosis/adenomyoma versus endometrial neoplasm. Further evaluation with pelvic MRI and tissue sampling is suggested. Heterogeneity within the endometrial cavity likely related to blood products. Fibroids as above." Plan for 2 units PRBC in CDU. Repeat H/H 9.1/29.3. Patient feels improved. Discussed with Dr. Griffin, patient cleared for discharge with follow up with OBGYN. 5

## 2025-06-04 NOTE — ED ADULT TRIAGE NOTE - GLASGOW COMA SCALE: SCORE, MLM
15 Implemented All Universal Safety Interventions:  [place] to call system. Call bell, personal items and telephone within reach. Instruct patient to call for assistance. Room bathroom lighting operational. Non-slip footwear when patient is off stretcher. Physically safe environment: no spills, clutter or unnecessary equipment. Stretcher in lowest position, wheels locked, appropriate side rails in place.

## 2025-06-04 NOTE — ED PROVIDER NOTE - CARDIAC, MLM
Normal rate, regular rhythm.  Heart sounds S1, S2.  No murmurs, rubs or gallops. Recently restarted Synthroid 25  Needs a TSH 8 weeks from the start date of his medication.  The lab is already ordered.  Please let him know that

## 2025-06-04 NOTE — ED PROVIDER NOTE - CLINICAL SUMMARY MEDICAL DECISION MAKING FREE TEXT BOX
ALYSSA Boles: 30y Female  approximately 23-24 weeks, AUGIE 25 with cervical cerclage placed a few weeks ago, followed with [facility], scheduled to delivery at [facility] presents to the ER for left sided abdominal pain and back pain described as contractions 2 minutes apart that started at 12pm, getting progressively worse. No pain medications given. Denies fever, chills, chest pain, SOB, difficulty breathing, n/v/d, urinary complaints, leakage of fluids, vaginal bleeding. Vital signs stable, heart and lungs normal on exam, gravid abdomen FHR 143bpm, no focal tenderness, head down on sono, bimanual exam performed by Dr Rae/[place], no head felt or dilation, head down on bedside sono. Transferring as tier 1 to [facility] L&D.

## 2025-06-04 NOTE — OB PROVIDER TRIAGE NOTE - GRAVIDA, OB PROFILE
Anesthesia Pre Eval Note    Anesthesia ROS/Med Hx    Overall Review:  Echo was reviewed and Stress test was reviewed     Anesthetic Complication History:    Patient does not have a history of anesthetic complications      Pulmonary Review:    Former smoker (50 pack year)     Cardiovascular Review:     Positive for past MI  Positive for CAD    Positive for cardiac stents  Positive for valvular problems/murmurs - murmur type MR  Positive for dysrhythmias - Atrial Fibrillation/Flutter  Positive for NORTON/ SOB  Positive for hyperlipidemia  Positive for DVT/PE    GI/HEPATIC/RENAL Review:   Positive for hiatal hernia  Positive for GERD    End/Other Review:    Positive for arthritis      Relevant Problems   No relevant active problems       Physical Exam     Airway   Mallampati: II  TM Distance: >3 FB  Neck ROM: Full    Cardiovascular  Cardiovascular exam normal    Dental Exam    Patient has:  Upper dentures    Pulmonary Exam  Pulmonary exam normal    Abdominal Exam  Abdominal exam normal      Vitals:   No data found.      Anesthesia Plan:    ASA Status: 3  Anesthesia Type: MAC    Induction: Intravenous  Preferred Airway Type: Nasal Cannula  Maintenance: TIVA    Postoperative analgesia plan does NOT include opiods    Checklist  Reviewed: Lab Results, Nursing Notes, Patient Summary, Beta Blocker Status, DNR Status, NPO Status, Problem list, Medications, Allergies, Past Med History and Anesthesia Record  Consent/Risks Discussed Statement:  The proposed anesthetic plan, including its risks and benefits, have been discussed with the Patient and Spouse along with the risks and benefits of alternatives. Questions were encouraged and answered and the patient and/or representative understands and agrees to proceed.        I discussed with the patient (and/or patient's legal representative) the risks and benefits of the proposed anesthesia plan, MAC, which may include services performed by other anesthesia providers.    Alternative  anesthesia plans, if available, were reviewed with the patient (and/or patient's legal representative). Discussion has been held with the patient (and/or patient's legal representative) regarding risks of anesthesia, which include hypotension, intra-operative awareness, nausea and vomiting and emergent situations that may require change in anesthesia plan.    The patient (and/or patient's legal representative) has indicated understanding, his/her questions have been answered, and he/she wishes to proceed with the planned anesthetic.    Blood Products: Not Anticipated     4

## 2025-06-04 NOTE — ED ADULT NURSE NOTE - NSFALLRISKINTERV_ED_ALL_ED
Assistance with ambulation/Communicate fall risk and risk factors to all staff, patient, and family/Provide visual cue: yellow wristband, yellow gown, etc/Reinforce activity limits and safety measures with patient and family/Call bell, personal items and telephone in reach/Instruct patient to call for assistance before getting out of bed/chair/stretcher/Non-slip footwear applied when patient is off stretcher/[place] to call system/Physically safe environment - no spills, clutter or unnecessary equipment/Purposeful Proactive Rounding/Room/bathroom lighting operational, light cord in reach

## 2025-06-04 NOTE — ED ADULT TRIAGE NOTE - CHIEF COMPLAINT QUOTE
BIBEMS 24-25 weeks pregnant c/o contractions q 2 mins lasting about 1 min each. EMS reports cervix sowed 2-3 weeks ago. Reports 3rd pregnancy, first child delivery at 35 weeks pregnant.

## 2025-06-04 NOTE — OB PROVIDER TRIAGE NOTE - NSOBPROVIDERNOTE_OBGYN_ALL_OB_FT
A/P: Pt is a 30y G_P_ who presents [active labor/SROM/PROM/ for induction of labor].      1. Admit to Labor and Delivery. Routine Labs. IV Fluids  2. Expectant Management vs. IOL  3. Fetus: Vertex, Reactive/Continue fetal monitoring  4.   5. GBS pos, for Amp / GBS neg  6. Pain: IV pain meds/epidural PRN      Shane Carrera, PGY-  Obstetrics and Gynecology    Discussed with A/P: Pt is a 29yo  @ 23w1d (AUGIE 2025) who presents via EMS from [facility] due to pre-term contractions. Speculum exam with a visually closed cervix and the cerclage was noted to be in place. Digital exam was closed without appreciable tension on the stitch. Patient was asymptomatic at time of evaluation and there were no regular contractions seen on toco. FH was checked and was 143. Briefly reviewed the concerns of contractions in the lake-viable period including risks of PTD and possible classical  section pending presentation and obstetric indication.   - GBS sent  - UA pending    Shane Carrera, PGY-3  Obstetrics and Gynecology    seen and evaluated w/ lesley Farrell fellow A/P: Pt is a 29yo  @ 23w1d (AUGIE 2025) who presents via EMS from [facility] VS due to pre-term contractions. Speculum exam with a visually closed cervix and the cerclage was noted to be in place. Digital exam was closed without appreciable tension on the stitch. Patient was asymptomatic at time of evaluation and there were no regular contractions seen on toco. FH was checked and was 143. Briefly reviewed the concerns of contractions in the lake-viable period including risks of PTD and possible classical  section pending presentation and obstetric indication.   - GBS sent  - UA pending    Shane Carrera, PGY-3  Obstetrics and Gynecology    seen and evaluated w/ lesley Farrell fellow              ____________________  LESLEY FELLOW ADDENDUM:  29yo  at 23w1d (AUGIE 2025) transferred from [facility] with concern of  labor.  She initially presented with regular abdominal pain midline/right sided every 3-5 minutes.  Denies leakage of fluid/vaginal bleeding; reports good fetal movement. No recent trauma, no dysuria/constipation/fevers. On arrival to [facility], pain had resolved.  VS wnl. Exam without uterine tenderness.  SSE and SVE closed cervix with cerclage visualized knot at 12oclock; cerclage not on tension. TOCO without contractions.  We discussed low concern for  labor at this time; however, we discussed her high risk of  labor given her history ( delivery last pregnancy at 35 weeks after exam indicated cerclage; ultrasound indicated cerclage placed this pregnancy at 18w). We reviewed implications or per-viability and fetal monitoring; fetal status is reassuring on ultrasound (BPP 8/8, ).  We discussed with monitoring possibility of classical  section, pt deferring monitoring at this time.  Will re-visit resuscitative measures/NICU consult if any cervical change.  One small 2cm fibroid without point tenderness.  Will follow up UA/UCx/GC/CT.  If no further pain, OK for discharge home with close follow up with OB at [place].  For reevaluation if pain returns.  Currently no recommendation for betamethasone/magnesium/tocolytics/antibiotics.     Discussed with Dr. Barker (MFM attending  Melyssa Marie MD PGY-6 A/P: Pt is a 31yo  @ 23w1d (AUGIE 2025) who presents via EMS from [facility] due to pre-term contractions. Speculum exam with a visually closed cervix and the cerclage was noted to be in place. Digital exam was closed without appreciable tension on the stitch. Patient was asymptomatic at time of evaluation and there were no regular contractions seen on toco. FH was checked and was 143. Briefly reviewed the concerns of contractions in the lake-viable period including risks of PTD and possible classical  section pending presentation and obstetric indication.   - GBS sent  - UA pending    1619  Evaluated. No further contractions. Return precautions reviewed  dc home. pt has outpatient follow up scheduled     Shane Carrera, PGY-3  Obstetrics and Gynecology    seen and evaluated w/ lesley Farrell fellow              ____________________  LESLEY FELLOW ADDENDUM:  31yo  at 23w1d (AUGIE 2025) transferred from [facility] with concern of  labor.  She initially presented with regular abdominal pain midline/right sided every 3-5 minutes.  Denies leakage of fluid/vaginal bleeding; reports good fetal movement. No recent trauma, no dysuria/constipation/fevers. On arrival to [facility], pain had resolved.  VS wnl. Exam without uterine tenderness.  SSE and SVE closed cervix with cerclage visualized knot at 12oclock; cerclage not on tension. TOCO without contractions.  We discussed low concern for  labor at this time; however, we discussed her high risk of  labor given her history ( delivery last pregnancy at 35 weeks after exam indicated cerclage; ultrasound indicated cerclage placed this pregnancy at 18w). We reviewed implications or per-viability and fetal monitoring; fetal status is reassuring on ultrasound (BPP 8/8, ).  We discussed with monitoring possibility of classical  section, pt deferring monitoring at this time.  Will re-visit resuscitative measures/NICU consult if any cervical change.  One small 2cm fibroid without point tenderness.  Will follow up UA/UCx/GC/CT.  If no further pain, OK for discharge home with close follow up with OB at [place].  For reevaluation if pain returns.  Currently no recommendation for betamethasone/magnesium/tocolytics/antibiotics.     Discussed with Dr. Barker (M attending  Melyssa Marie MD PGY-6

## 2025-06-04 NOTE — ED PROVIDER NOTE - ATTENDING APP SHARED VISIT CONTRIBUTION OF CARE
I, Lam Rae, DO personally saw the patient with DANE.  I have personally performed a face to face diagnostic evaluation on this patient.  I have reviewed the DANE note and agree with the history, exam, and plan of care, except as noted.  I personally saw the patient and performed a substantive portion of the visit including all aspects of the medical decision making.

## 2025-06-04 NOTE — OB RN TRIAGE NOTE - FALL HARM RISK - RISK INTERVENTIONS

## 2025-06-04 NOTE — ED ADULT NURSE NOTE - OBJECTIVE STATEMENT
Pt presents to the ed c/o vaginal discharge and contractions x this morning while on the bus. Pt's LMP was 2025. Pt is 24-25 weeks pregnant. Pt is pt's 3rd pregnancy with one child at home. Pt  was noted to have intermittent contractions, Vaginal examination was done by Md and no  was reported.

## 2025-06-04 NOTE — OB PROVIDER TRIAGE NOTE - HISTORY OF PRESENT ILLNESS
HPI: Pt is a 30y  G_P_  presenting for X.  Fetal movement (+)  Leakage of fluid (-)  Contractions (-)  Vaginal bleeding (-)  GBS pos/neg  Estimated fetal weight:    Prenatal care complicated by     OBHx:  GynHx: Denies any gynecologic issues  PMHx: Denies  PSHx: Denies  Med: PNV  All: NKDA  Psych: Denies hx of mental health issues  SH: Denies hx of smoking, drinking, or drug usage during the pregnancy    Vital Signs Last 24 Hrs  T(C): 36.7 (04 Jun 2025 13:22), Max: 36.7 (04 Jun 2025 13:22)  T(F): 98.1 (04 Jun 2025 13:22), Max: 98.1 (04 Jun 2025 13:22)  HR: 91 (04 Jun 2025 13:36) (90 - 91)  BP: 124/78 (04 Jun 2025 13:36) (124/78 - 128/85)  BP(mean): --  RR: 20 (04 Jun 2025 13:36) (19 - 20)  SpO2: 100% (04 Jun 2025 13:36) (100% - 100%)        SVE:  FHT:  [place]:  Sono:           HPI: Pt is a 29yo  @ 23w1d (AUGIE 2025) who presents via EMS from [facility] for reported contractions that started earlier today around noon. Reports q5min contractions that were intermittent and would go away. Was given IV fluids and Acetaminophen at the outside hospital-reports that her contractions had resolved en route via the ambulance. Denies any fevers, chills, dysuria, dehydration, or constipation  Fetal movement (+)  Leakage of fluid (-)  Contractions, see above  Vaginal bleeding (-)    Prenatal care complicated by   - [facility] w/ [facility]. Limited records available via [facility] phone records  a. Arce cerclage placed at 19w0d, history and ultrasound indicated per description  - Polypropylene suture used   b. Low lying placenta     OBHx:  - 2018.  @ 35wks due to PPROM  -> Cerclage placed at ~16wks. Describes being dilated for which the cerclage was then placed   - Sab x1. ToP x1  GynHx: Fibroids   PMHx: Anemia   - Review via phone records note a hx of GERD and chronic migraines   PSHx: Lsc Umbilical hernia repair w/ mesh  Med: PNV, bASA, Vit D  All: NKDA  SH: Denies hx of smoking, drinking, or drug usage during the pregnancy    Vital Signs Last 24 Hrs  T(C): 36.7 (2025 13:22), Max: 36.7 (2025 13:22)  T(F): 98.1 (2025 13:22), Max: 98.1 (2025 13:22)  HR: 91 (2025 13:36) (90 - 91)  BP: 124/78 (2025 13:36) (124/78 - 128/85)  BP(mean): --  RR: 20 (2025 13:36) (19 - 20)  SpO2: 100% (2025 13:36) (100% - 100%)    Gen: No acute distress. Awake. Alert. Laughing during interview   CV: Regular rate on bedside monitor   Pulm: Clear to auscultation bilaterally. No wheezes, crackles, or rhonchi  Back: No CVA tenderness bilaterally   Abd: Soft. Gravid. Non-tender  Bedside sono: Cephalic.   Extremities: No calf tenderness bilaterally     SSE: Opaque discharge in the vault. Cervix appears visually closed. Blue suture seen at 12:00. No blood in the vault  SVE: Closed on digital exam with no appreciable tension on the suture material

## 2025-06-04 NOTE — ED PROVIDER NOTE - NONTENDER LOCATION
left upper quadrant/right upper quadrant/left lower quadrant/right lower quadrant/periumbilical/umbilical

## 2025-06-04 NOTE — ED ADULT NURSE REASSESSMENT NOTE - NS ED NURSE REASSESS COMMENT FT1
Assisting primary RN Denis. report given to [facility] ambulance transfer team at bedside. pt A&ox4, no acute distress noted. respirations even and unlabored. transfer paperwork signed and sent with team. transfer team acknowledged report. all belongings brought with pt to hospital sent with her to [facility].

## 2025-06-05 LAB
C TRACH RRNA SPEC QL NAA+PROBE: SIGNIFICANT CHANGE UP
N GONORRHOEA RRNA SPEC QL NAA+PROBE: SIGNIFICANT CHANGE UP
SPECIMEN SOURCE: SIGNIFICANT CHANGE UP
T VAGINALIS RRNA SPEC QL NAA+PROBE: SIGNIFICANT CHANGE UP

## 2025-06-06 LAB
GROUP B BETA STREP DNA (PCR): SIGNIFICANT CHANGE UP
SOURCE GROUP B STREP: SIGNIFICANT CHANGE UP

## 2025-06-08 LAB
CULTURE RESULTS: SIGNIFICANT CHANGE UP
SPECIMEN SOURCE: SIGNIFICANT CHANGE UP

## 2025-06-09 RX ORDER — PRENATAL 136/IRON/FOLIC ACID 27 MG-1 MG
1 TABLET ORAL
Refills: 0 | DISCHARGE

## 2025-06-09 RX ORDER — PROGESTERONE 200 MG/1
CAPSULE ORAL
Refills: 0 | DISCHARGE

## 2025-06-09 RX ORDER — ASPIRIN 325 MG
1 TABLET ORAL
Refills: 0 | DISCHARGE

## 2025-06-10 DIAGNOSIS — D64.9 ANEMIA, UNSPECIFIED: ICD-10-CM

## 2025-06-10 DIAGNOSIS — O26.899 OTHER SPECIFIED PREGNANCY RELATED CONDITIONS, UNSPECIFIED TRIMESTER: ICD-10-CM

## 2025-06-10 DIAGNOSIS — O44.42 LOW LYING PLACENTA NOS OR WITHOUT HEMORRHAGE, SECOND TRIMESTER: ICD-10-CM

## 2025-06-10 DIAGNOSIS — O34.32 MATERNAL CARE FOR CERVICAL INCOMPETENCE, SECOND TRIMESTER: ICD-10-CM

## 2025-06-10 DIAGNOSIS — Z3A.23 23 WEEKS GESTATION OF PREGNANCY: ICD-10-CM

## 2025-06-10 DIAGNOSIS — O47.02 FALSE LABOR BEFORE 37 COMPLETED WEEKS OF GESTATION, SECOND TRIMESTER: ICD-10-CM

## 2025-06-10 DIAGNOSIS — O09.292 SUPERVISION OF PREGNANCY WITH OTHER POOR REPRODUCTIVE OR OBSTETRIC HISTORY, SECOND TRIMESTER: ICD-10-CM

## 2025-06-10 DIAGNOSIS — D21.9 BENIGN NEOPLASM OF CONNECTIVE AND OTHER SOFT TISSUE, UNSPECIFIED: ICD-10-CM

## 2025-06-10 DIAGNOSIS — O99.012 ANEMIA COMPLICATING PREGNANCY, SECOND TRIMESTER: ICD-10-CM

## 2025-06-10 DIAGNOSIS — O99.891 OTHER SPECIFIED DISEASES AND CONDITIONS COMPLICATING PREGNANCY: ICD-10-CM

## (undated) DEVICE — KIT ENDO PROCEDURE CUST 10/BX

## (undated) DEVICE — CLAMP BX HOT RAD JAW 3

## (undated) DEVICE — CONTAINER FORMALIN 10% 20ML

## (undated) DEVICE — CATH ELCTR GLIDE PRB 7FR

## (undated) DEVICE — RETRIEVER ROTH NET PLATINUM-UNIVERSAL

## (undated) DEVICE — LUBE JELLY FOILPACK 36GM STERILE

## (undated) DEVICE — MASK OXYGEN PANORAMIC

## (undated) DEVICE — SENSOR O2 FINGER ADULT 24/CA

## (undated) DEVICE — BITE BLOCK MOUTHPCW/STRAP

## (undated) DEVICE — VALVE ENDO SURESEAL II 0-5FR

## (undated) DEVICE — TUBING CANNULA SALTER LABS NASAL ADULT 7FT

## (undated) DEVICE — VALVE ENDOSCOPE DEFENDO SINGLE USE

## (undated) DEVICE — SNARE LOOP POLY DISP 30MM LOOP

## (undated) DEVICE — TUBING MEDI-VAC W MAXIGRIP CONNECTORS 1/4"X6'

## (undated) DEVICE — DRSG GAUZE 4X4"

## (undated) DEVICE — TUBING IV SET GRAVITY 3Y 100" MACRO

## (undated) DEVICE — SOL INJ NS 0.9% 500ML 1-PORT

## (undated) DEVICE — RADIAL JAW 4 240CM WITH NDL

## (undated) DEVICE — TUBING ENDO EXT OLYMPUS 160 24HR USE

## (undated) DEVICE — NDL INJ SCLERO INTERJECT 23G

## (undated) DEVICE — SYR LUER LOK 50CC

## (undated) DEVICE — Device

## (undated) DEVICE — TUBE RECTAL 24FR